# Patient Record
Sex: MALE | Race: WHITE | NOT HISPANIC OR LATINO | Employment: OTHER | URBAN - METROPOLITAN AREA
[De-identification: names, ages, dates, MRNs, and addresses within clinical notes are randomized per-mention and may not be internally consistent; named-entity substitution may affect disease eponyms.]

---

## 2017-01-31 RX ORDER — PRAVASTATIN SODIUM 40 MG
TABLET ORAL
Qty: 90 TABLET | Refills: 2 | Status: SHIPPED | OUTPATIENT
Start: 2017-01-31 | End: 2018-04-30 | Stop reason: SDUPTHER

## 2017-02-01 ENCOUNTER — OFFICE VISIT (OUTPATIENT)
Dept: ORTHOPEDIC SURGERY | Facility: CLINIC | Age: 62
End: 2017-02-01

## 2017-02-01 VITALS — WEIGHT: 210 LBS | BODY MASS INDEX: 27.83 KG/M2 | HEIGHT: 73 IN

## 2017-02-01 DIAGNOSIS — IMO0002 BURSITIS/TENDONITIS, SHOULDER: Primary | ICD-10-CM

## 2017-02-01 PROCEDURE — 99212 OFFICE O/P EST SF 10 MIN: CPT | Performed by: ORTHOPAEDIC SURGERY

## 2017-02-01 RX ORDER — FINASTERIDE 5 MG/1
TABLET, FILM COATED ORAL
COMMUNITY
Start: 2017-01-10 | End: 2018-04-30

## 2017-02-01 NOTE — PROGRESS NOTES
Subjective: Right shoulder pain     Patient ID: Chad Marte is a 61 y.o. male.    Chief Complaint:    History of Present Illness 61-year-old male returns with recurrent right shoulder pain.  He's had this for 9 months or longer.  Is undergone a cortisone injection which helped for approximately 4 weeks.  His been on anti-inflammatories is noted no response.  He is having pain with activities of daily living.       Social History     Occupational History   • Not on file.     Social History Main Topics   • Smoking status: Never Smoker   • Smokeless tobacco: Never Used   • Alcohol use Not on file   • Drug use: No   • Sexual activity: Defer      Review of Systems   Constitutional: Negative for chills, diaphoresis, fever and unexpected weight change.   HENT: Negative for hearing loss, nosebleeds, sore throat and tinnitus.    Eyes: Negative for pain and visual disturbance.   Respiratory: Negative for cough, shortness of breath and wheezing.    Cardiovascular: Negative for chest pain and palpitations.   Gastrointestinal: Negative for abdominal pain, diarrhea, nausea and vomiting.   Endocrine: Negative for cold intolerance, heat intolerance and polydipsia.   Genitourinary: Negative for difficulty urinating, dysuria and hematuria.   Musculoskeletal: Positive for arthralgias and myalgias. Negative for joint swelling.   Skin: Negative for rash and wound.   Allergic/Immunologic: Negative for environmental allergies.   Neurological: Negative for dizziness, syncope and numbness.   Hematological: Does not bruise/bleed easily.   Psychiatric/Behavioral: Negative for dysphoric mood and sleep disturbance. The patient is not nervous/anxious.          Past Medical History   Diagnosis Date   • Arthritis    • Hypercholesteremia    • Rotator cuff syndrome    • Tear of meniscus of knee      Past Surgical History   Procedure Laterality Date   • Tonsillectomy     • Hernia repair     • Fracture surgery Right      R arm- plate   • Tendon transfer  Right      hand   • Colonoscopy N/A 5/27/2016     Procedure: COLONOSCOPY / polypectomy;  Surgeon: Heath Naik MD;  Location: [facility];  Service:      Family History   Problem Relation Age of Onset   • Cancer Father          Objective:  There were no vitals filed for this visit.  Last 3 weights    02/01/17  1253   Weight: 210 lb (95.3 kg)     Body mass index is 27.71 kg/(m^2).       Ortho Exam  there is no motor deficit good distal pulses.  Mildly positive impingement sign abduction and extension against resistance is intact although moderately painful marked pain with internal rotation with a positive lift off.  Mildly positive for cyst.    Assessment:       1. Bursitis/tendonitis, shoulder          Plan:  We'll proceed with an MR arthrogram return after completion of the test          Work Status:    SANDHYA query complete.    Orders:  Orders Placed This Encounter   Procedures   • MRI Shoulder Right Arthrogram       Medications:  New Medications Ordered This Visit   Medications   • finasteride (PROSCAR) 5 MG tablet       Followup:  Return in about 1 week (around 2/8/2017).          Dragon transcription disclaimer     Much of this encounter note is an electronic transcription/translation of spoken language to printed text. The electronic translation of spoken language may permit erroneous, or at times, nonsensical words or phrases to be inadvertently transcribed. Although I have reviewed the note for such errors, some may still exist.

## 2017-02-02 ENCOUNTER — HOSPITAL ENCOUNTER (OUTPATIENT)
Dept: GENERAL RADIOLOGY | Facility: HOSPITAL | Age: 62
Discharge: HOME | End: 2017-02-02
Attending: ORTHOPAEDIC SURGERY

## 2017-02-02 ENCOUNTER — HOSPITAL ENCOUNTER (OUTPATIENT)
Dept: MRI IMAGING | Facility: HOSPITAL | Age: 62
Discharge: HOME | End: 2017-02-02
Attending: ORTHOPAEDIC SURGERY | Admitting: ORTHOPAEDIC SURGERY

## 2017-02-02 DIAGNOSIS — IMO0002 BURSITIS/TENDONITIS, SHOULDER: ICD-10-CM

## 2017-02-02 PROCEDURE — 73040 CONTRAST X-RAY OF SHOULDER: CPT

## 2017-02-02 PROCEDURE — 73222 MRI JOINT UPR EXTREM W/DYE: CPT

## 2017-02-02 PROCEDURE — 0 GADOBENATE DIMEGLUMINE 529 MG/ML SOLUTION: Performed by: ORTHOPAEDIC SURGERY

## 2017-02-02 PROCEDURE — A9577 INJ MULTIHANCE: HCPCS | Performed by: ORTHOPAEDIC SURGERY

## 2017-02-02 PROCEDURE — 0 IOPAMIDOL 61 % SOLUTION: Performed by: ORTHOPAEDIC SURGERY

## 2017-02-02 RX ORDER — LIDOCAINE HYDROCHLORIDE 10 MG/ML
3 INJECTION, SOLUTION INFILTRATION; PERINEURAL ONCE
Status: COMPLETED | OUTPATIENT
Start: 2017-02-02 | End: 2017-02-02

## 2017-02-02 RX ADMIN — IOPAMIDOL 10 ML: 612 INJECTION, SOLUTION INTRAVENOUS at 11:05

## 2017-02-02 RX ADMIN — LIDOCAINE HYDROCHLORIDE 3 ML: 10 INJECTION, SOLUTION INFILTRATION; PERINEURAL at 11:05

## 2017-02-02 RX ADMIN — GADOBENATE DIMEGLUMINE 1 ML: 529 INJECTION, SOLUTION INTRAVENOUS at 11:00

## 2017-02-06 ENCOUNTER — OFFICE VISIT (OUTPATIENT)
Dept: ORTHOPEDIC SURGERY | Facility: CLINIC | Age: 62
End: 2017-02-06

## 2017-02-06 DIAGNOSIS — IMO0002 BURSITIS/TENDONITIS, SHOULDER: Primary | ICD-10-CM

## 2017-02-06 PROCEDURE — 99212 OFFICE O/P EST SF 10 MIN: CPT | Performed by: ORTHOPAEDIC SURGERY

## 2017-02-06 RX ORDER — METHYLPREDNISOLONE 4 MG/1
TABLET ORAL
Qty: 21 TABLET | Refills: 0 | Status: SHIPPED | OUTPATIENT
Start: 2017-02-06 | End: 2017-04-28

## 2017-02-06 NOTE — PROGRESS NOTES
Subjective: Right shoulder pain     Patient ID: Chad Marte is a 61 y.o. male.    Chief Complaint:    History of Present Illness patient returns with results of the MRI arthrogram of his right shoulder which I personally reviewed and shows acromioclavicular arthritis but no other pathology.  No rotator cuff or labral tear.  The cortisone injection given last for only about 4 weeks.       Social History     Occupational History   • Not on file.     Social History Main Topics   • Smoking status: Never Smoker   • Smokeless tobacco: Never Used   • Alcohol use Not on file   • Drug use: No   • Sexual activity: Defer      Review of Systems   Constitutional: Negative for chills, diaphoresis, fever and unexpected weight change.   HENT: Negative for hearing loss, nosebleeds, sore throat and tinnitus.    Eyes: Negative for pain and visual disturbance.   Respiratory: Negative for cough, shortness of breath and wheezing.    Cardiovascular: Negative for chest pain and palpitations.   Gastrointestinal: Negative for abdominal pain, diarrhea, nausea and vomiting.   Endocrine: Negative for cold intolerance, heat intolerance and polydipsia.   Genitourinary: Negative for difficulty urinating, dysuria and hematuria.   Musculoskeletal: Negative for arthralgias, joint swelling and myalgias.   Skin: Negative for rash and wound.   Allergic/Immunologic: Negative for environmental allergies.   Neurological: Negative for dizziness, syncope and numbness.   Hematological: Does not bruise/bleed easily.   Psychiatric/Behavioral: Negative for dysphoric mood and sleep disturbance. The patient is not nervous/anxious.          Past Medical History   Diagnosis Date   • Arthritis    • Hypercholesteremia    • Rotator cuff syndrome    • Tear of meniscus of knee      Past Surgical History   Procedure Laterality Date   • Tonsillectomy     • Hernia repair     • Fracture surgery Right      R arm- plate   • Tendon transfer Right      hand   • Colonoscopy N/A  5/27/2016     Procedure: COLONOSCOPY / polypectomy;  Surgeon: Heath Naik MD;  Location: [facility];  Service:      Family History   Problem Relation Age of Onset   • Cancer Father          Objective:  There were no vitals filed for this visit.  There were no vitals filed for this visit.  There is no height or weight on file to calculate BMI.       Ortho Exam  exam is unchanged.  There is no motor deficit good distal pulses.  There is pain with abduction and extension against resistance but it is intact.    Assessment:       1. Bursitis/tendonitis, shoulder          Plan:      we will try a Medrol dose pack followed by resumption of the Mobic.  Return in 4 weeks if his pain persists his only other option may be an arthroscopic Caesar procedure but I like to try the medication first.      Work Status:    SANDHYA query complete.    Orders:  No orders of the defined types were placed in this encounter.      Medications:  New Medications Ordered This Visit   Medications   • MethylPREDNISolone (MEDROL, OLIVIA,) 4 MG tablet     Sig: Use as directed by package instructions     Dispense:  21 tablet     Refill:  0       Followup:  Return in about 4 weeks (around 3/6/2017).          Dragon transcription disclaimer     Much of this encounter note is an electronic transcription/translation of spoken language to printed text. The electronic translation of spoken language may permit erroneous, or at times, nonsensical words or phrases to be inadvertently transcribed. Although I have reviewed the note for such errors, some may still exist.

## 2017-03-08 ENCOUNTER — OFFICE VISIT (OUTPATIENT)
Dept: ORTHOPEDIC SURGERY | Facility: CLINIC | Age: 62
End: 2017-03-08

## 2017-03-08 DIAGNOSIS — IMO0002 BURSITIS/TENDONITIS, SHOULDER: Primary | ICD-10-CM

## 2017-03-08 PROCEDURE — 99212 OFFICE O/P EST SF 10 MIN: CPT | Performed by: ORTHOPAEDIC SURGERY

## 2017-03-13 ENCOUNTER — HOSPITAL ENCOUNTER (OUTPATIENT)
Dept: PHYSICAL THERAPY | Facility: HOSPITAL | Age: 62
Setting detail: THERAPIES SERIES
Discharge: HOME | End: 2017-03-13

## 2017-03-13 DIAGNOSIS — IMO0002 BURSITIS/TENDONITIS, SHOULDER: Primary | ICD-10-CM

## 2017-03-13 PROCEDURE — 97161 PT EVAL LOW COMPLEX 20 MIN: CPT

## 2017-03-13 NOTE — PROGRESS NOTES
Outpatient Physical Therapy Ortho Initial Evaluation   Lucy Knapp     Patient Name: Chad Marte  : 1955  MRN: [id number]  Today's Date: 3/13/2017      Visit Date: 2017    Patient Active Problem List   Diagnosis   • Generalized osteoarthritis   • Hematuria   • Hyperlipidemia   • Proteinuria   • Preventative health care   • Health care maintenance   • H/O prostate biopsy   • Primary osteoarthritis of left knee        Past Medical History   Diagnosis Date   • Arthritis    • Hypercholesteremia    • Rotator cuff syndrome    • Tear of meniscus of knee         Past Surgical History   Procedure Laterality Date   • Tonsillectomy     • Hernia repair     • Fracture surgery Right      R arm- plate   • Tendon transfer Right      hand   • Colonoscopy N/A 2016     Procedure: COLONOSCOPY / polypectomy;  Surgeon: Heath Naik MD;  Location: [facility] OR;  Service:        Visit Dx:     ICD-10-CM ICD-9-CM   1. Bursitis/tendonitis, shoulder M71.9 726.10    M67.919              Patient History       17 0800          History    Chief Complaint Difficulty with daily activities;Joint stiffness;Muscle tenderness;Muscle weakness;Pain  -AS      Type of Pain Shoulder pain   Right  -AS      Date Current Problem(s) Began 16  -AS      Brief Description of Current Complaint Patient reports he initially injured his right shoulder while passing ball with his grandson. Patient states his pain has worsened over past few months. He received an injection that did help with his pain for about 1 month. Patient sattes the pain and discomfort affects his workouts and ADL's.  -AS      Onset Date- PT 3-13-17  -AS      Patient/Caregiver Goals Relieve pain;Return to prior level of function;Improve mobility;Improve strength  -AS      Patient's Rating of General Health Good  -AS      Hand Dominance right-handed  -AS      Occupation/sports/leisure activities working out  -AS      Pain     Pain Location Shoulder  -AS       Pain at Present 0  -AS      Pain at Best 0  -AS      Pain at Worst 8  -AS      Pain Frequency Intermittent  -AS      Pain Description Aching  -AS      What Performance Factors Make the Current Problem(s) WORSE? overhead motion  -AS      What Performance Factors Make the Current Problem(s) BETTER? rest  -AS      Daily Activities    Primary Language English  -AS      How does patient learn best? Listening;Reading  -AS      Teaching needs identified Home Exercise Program;Management of Condition  -AS      Patient is concerned about/has problems with Performing home management (household chores, shopping, care of dependents);Performing job responsibilities/community activities (work, school,;Performing sports, recreation, and play activities;Reaching over head;Repetitive movements of the hand, arm, shoulder  -AS      Does patient have problems with the following? None  -AS      Barriers to learning None  -AS      Pt Participated in POC and Goals Yes  -AS      Safety    Are you being hurt, hit, or frightened by anyone at home or in your life? No  -AS      Are you being neglected by a caregiver No  -AS        User Key  (r) = Recorded By, (t) = Taken By, (c) = Cosigned By    Initials Name Provider Type    AS Johnathan Grace, PT Physical Therapist                PT Ortho       03/13/17 0800    Precautions and Contraindications    Precautions/Limitations no known precautions/limitations  -AS    Posture/Observations    Posture- WNL Posture is WNL  -AS    Sensation    Sensation WNL? WNL  -AS    Shoulder Girdle Palpation    AC Joint Right:;Tender  -AS    Long Head of Biceps Right:;Tender  -AS    Pect Minor Right:;Tender  -AS    Shoulder Laxity/Instability Special Tests    Horizontal Adduction Test (AC Joint Pain) Right:;Positive  -AS    Right Shoulder    Flexion AROM Deficit 152  -AS    ABduction AROM Deficit 156  -AS    External Rotation AROM Deficit 32  -AS    Internal Rotation AROM Deficit WNL  -AS    Right Shoulder     Flexion Gross Movement (4-/5) good minus  -AS    ABduction Gross Movement (4-/5) good minus  -AS    Int Rotation Gross Movement (4-/5) good minus  -AS    Ext Rotation Gross Movement (4-/5) good minus  -AS      User Key  (r) = Recorded By, (t) = Taken By, (c) = Cosigned By    Initials Name Provider Type    AS Johnathan Grace, PT Physical Therapist                            Therapy Education       03/13/17 0833          Therapy Education    Given HEP  -AS      Program New  -AS      How Provided Verbal;Written;Demonstration  -AS      Provided to Patient  -AS      Level of Understanding Teach back education performed;Verbalized;Demonstrated  -AS        User Key  (r) = Recorded By, (t) = Taken By, (c) = Cosigned By    Initials Name Provider Type    AS Johnathan Grace, PT Physical Therapist                PT OP Goals       03/13/17 0800       PT Short Term Goals    STG Date to Achieve 03/27/17  -AS     STG 1 Patient to be compliant with his initial HEP for ROM, flexibility, and strength.  -AS     STG 2 Patient to report pain on VAS of 4-5/10 when performing ADL's, recreational, and sport related activities.  -AS     STG 3 Patient to increase his general right shoulder strength to 4/5.  -AS     Long Term Goals    LTG Date to Achieve 04/10/17  -AS     LTG 1 Patient to be compliant with his advanced HEP for ROM, flexibility, and strength.  -AS     LTG 2 Patient to report pain on VAS of 1-2/10 when performing ADL's, recreational, and sport related activities.  -AS     LTG 3 Patient to increase his general right shoulder strength to 4+/5.  -AS     LTG 4 Patient to improve his right shoulder AROM to WNL in all planes.  -AS     LTG 5 Patient to report improved function and decreased pain in his right shoulder/AC joint on Quick DASH by >20 points.  -AS     Time Calculation    PT Goal Re-Cert Due Date 04/10/17  -AS       User Key  (r) = Recorded By, (t) = Taken By, (c) = Cosigned By    Initials Name Provider Type     AS Johnathan Grace, PT Physical Therapist                PT Assessment/Plan       03/13/17 0800       PT Assessment    Functional Limitations Limitation in home management;Limitations in community activities;Performance in leisure activities;Performance in self-care ADL;Performance in sport activities;Performance in work activities  -AS     Impairments Impaired flexibility;Joint mobility;Muscle strength;Pain;Range of motion  -AS     Assessment Comments Patient presents to outpatient PT with complaints of right AC joint pain and discomfort. Patient has limited ROM in right shoulder, limited right shoulder strength, and increased right shoulder/AC joint pain. Patient has decreased function due to the above.  -AS     Please refer to paper survey for additional self-reported information Yes  -AS     Rehab Potential Good  -AS     Patient/caregiver participated in establishment of treatment plan and goals Yes  -AS     Patient would benefit from skilled therapy intervention Yes  -AS     PT Plan    PT Frequency 2x/week  -AS     Predicted Duration of Therapy Intervention (days/wks) 4 weeks  -AS     Planned CPT's? PT RE-EVAL: 93932;PT THER PROC EA 15 MIN: 90907;PT THER ACT EA 15 MIN: 77058;PT MANUAL THERAPY EA 15 MIN: 15683;PT HOT OR COLD PACK TREAT [facility];PT ELECTRICAL STIM UNATTEND: ;PT ULTRASOUND EA 15 MIN: 79825  -AS       User Key  (r) = Recorded By, (t) = Taken By, (c) = Cosigned By    Initials Name Provider Type    AS Johnathan Grace, PT Physical Therapist                Modalities       03/13/17 0800          Moist Heat    MH Applied Yes  -AS      Location right shoulder  -AS      Rx Minutes 10 mins  -AS      MH Prior to Rx Yes  -AS      Ice    Ice Applied Yes  -AS      Location right shoulder  -AS      Rx Minutes 10 mins  -AS      Ice S/P Rx Yes  -AS      Ultrasound 29492    Location right AC joint  -AS      Rx Minutes 8 min  -AS      Duty Cycle 100  -AS      Frequency 1.0 MHz  -AS      Intensity -  Wts/cm 1.2  -AS        User Key  (r) = Recorded By, (t) = Taken By, (c) = Cosigned By    Initials Name Provider Type    AS Johnathan Grace, PT Physical Therapist              Exercises       03/13/17 0800          Subjective Pain    Able to rate subjective pain? yes  -AS      Pre-Treatment Pain Level 0  -AS      Post-Treatment Pain Level 0  -AS      Exercise 1    Exercise Name 1 IR Towel Stretch  -AS      Reps 1 10  -AS      Time (Seconds) 1 10 sec hold.  -AS      Exercise 2    Exercise Name 2 Sleeper Stretch  -AS      Reps 2 10  -AS      Time (Seconds) 2 10 sec hold  -AS      Exercise 3    Exercise Name 3 Sidelying ER  -AS      Equipment 3 Dumbell  -AS      Weights/Plates 3 1  -AS      Reps 3 25  -AS      Exercise 4    Exercise Name 4 Prone I, T  -AS      Equipment 4 Dumbell  -AS      Weights/Plates 4 1  -AS      Reps 4 25  -AS      Exercise 5    Exercise Name 5 Rows  -AS      Equipment 5 Theraband  -AS      Resistance 5 Blue  -AS      Reps 5 25  -AS      Exercise 6    Exercise Name 6 Shoulder Ext. vs Band  -AS      Equipment 6 Theraband  -AS      Resistance 6 Blue  -AS      Reps 6 25  -AS        User Key  (r) = Recorded By, (t) = Taken By, (c) = Cosigned By    Initials Name Provider Type    AS Johnathan Grace, PT Physical Therapist                              Outcome Measures       03/13/17 0800          Quick DASH    Open a tight or new jar. 3  -AS      Do heavy household chores (e.g., wash walls, wash floors) 4  -AS      Carry a shopping bag or briefcase 3  -AS      Wash your back 4  -AS      Use a knife to cut food 1  -AS      Recreational activities in which you take some force or impact through your arm, should or hand (e.g. golf, hammering, tennis, etc.) 3  -AS      During the past week, to what extent has your arm, shoulder, or hand problem interfered with your normal social activites with family, friends, neighbors or groups? 3  -AS      During the past week, were you limited in your work or  other regular daily activities as a result of your arm, shoulder or hand problem? 4  -AS      Arm, Shoulder, or hand pain 4  -AS      Tingling (pins and needles) in your arm, shoulder, or hand 1  -AS      During the past week, how much difficulty have you had sleeping because of the pain in your arm, shoulder or hand? 3  -AS      Number of Questions Answered 11  -AS      Quick DASH Score 50  -AS      Sports/Performing Arts Module (Optional)    Using your usual technique for playing your instrument or sport? 3  -AS      Playing your musical instrument or sport because of arm, shoulder or hand pain? 3  -AS      Playing your musical instrument or sport as well as you would like? 3  -AS      Spending your usual amount of time practising or playing your instrument or sport? 3  -AS      Sports/Performing Arts Score 50  -AS      Functional Assessment    Outcome Measure Options Quick DASH  -AS        User Key  (r) = Recorded By, (t) = Taken By, (c) = Cosigned By    Initials Name Provider Type    AS Johnathan Grace, PT Physical Therapist            Time Calculation:   Start Time: 0800  Stop Time: 0915  Time Calculation (min): 75 min     Therapy Charges for Today     Code Description Service Date Service Provider Modifiers Qty    53270680935  PT EVAL LOW COMPLEXITY 4 3/13/2017 Johnathan Grace, PT GP 1          PT G-Codes  Outcome Measure Options: Quick DASH         Johnathan Grace, PT  3/13/2017

## 2017-03-16 ENCOUNTER — HOSPITAL ENCOUNTER (OUTPATIENT)
Dept: PHYSICAL THERAPY | Facility: HOSPITAL | Age: 62
Setting detail: THERAPIES SERIES
Discharge: HOME | End: 2017-03-16

## 2017-03-16 DIAGNOSIS — IMO0002 BURSITIS/TENDONITIS, SHOULDER: Primary | ICD-10-CM

## 2017-03-16 PROCEDURE — 97110 THERAPEUTIC EXERCISES: CPT

## 2017-03-16 PROCEDURE — 97035 APP MDLTY 1+ULTRASOUND EA 15: CPT

## 2017-03-16 NOTE — PROGRESS NOTES
Outpatient Physical Therapy Ortho Treatment Note   Lucy Knapp     Patient Name: Chad Marte  : 1955  MRN: [id number]  Today's Date: 3/16/2017      Visit Date: 2017    Visit Dx:    ICD-10-CM ICD-9-CM   1. Bursitis/tendonitis, shoulder M71.9 726.10    M67.919        Patient Active Problem List   Diagnosis   • Generalized osteoarthritis   • Hematuria   • Hyperlipidemia   • Proteinuria   • Preventative health care   • Health care maintenance   • H/O prostate biopsy   • Primary osteoarthritis of left knee        Past Medical History   Diagnosis Date   • Arthritis    • Hypercholesteremia    • Rotator cuff syndrome    • Tear of meniscus of knee         Past Surgical History   Procedure Laterality Date   • Tonsillectomy     • Hernia repair     • Fracture surgery Right      R arm- plate   • Tendon transfer Right      hand   • Colonoscopy N/A 2016     Procedure: COLONOSCOPY / polypectomy;  Surgeon: Heath Naik MD;  Location: [facility];  Service:                              PT Assessment/Plan       17 0800       PT Assessment    Assessment Comments Progressed patient with strengthening exercises today without complaints of increased right AC joint pain or discomfort.  -AS     PT Plan    PT Plan Comments Continue with current treatment plan.  -AS       User Key  (r) = Recorded By, (t) = Taken By, (c) = Cosigned By    Initials Name Provider Type    AS Johnathan Grace, PT Physical Therapist                Modalities       17 0800          Moist Heat    MH Applied Yes  -AS      Location right shoulder  -AS      Rx Minutes 10 mins  -AS      MH Prior to Rx Yes  -AS      Ice    Ice Applied Yes  -AS      Location right shoulder  -AS      Rx Minutes 10 mins  -AS      Ice S/P Rx Yes  -AS      Ultrasound 68636    Location right AC joint  -AS      Rx Minutes 8 min  -AS      Duty Cycle 100  -AS      Frequency 1.0 MHz  -AS      Intensity - Wts/cm 1.2  -AS        User Key  (r) = Recorded  By, (t) = Taken By, (c) = Cosigned By    Initials Name Provider Type    AS Johnathan Grace, PT Physical Therapist                Exercises       03/16/17 0800          Subjective Comments    Subjective Comments Patient states that his pain is better but reports he is sore from his exercises.  -AS      Exercise 1    Exercise Name 1 IR Towel Stretch  -AS      Reps 1 10  -AS      Time (Seconds) 1 10 sec hold.  -AS      Exercise 2    Exercise Name 2 Sleeper Stretch  -AS      Reps 2 10  -AS      Time (Seconds) 2 10 sec hold  -AS      Exercise 3    Exercise Name 3 Sidelying ER  -AS      Equipment 3 Dumbell  -AS      Weights/Plates 3 1  -AS      Reps 3 30  -AS      Exercise 4    Exercise Name 4 Prone I, T  -AS      Equipment 4 Dumbell  -AS      Weights/Plates 4 1  -AS      Reps 4 30  -AS      Exercise 5    Exercise Name 5 Rows  -AS      Equipment 5 Theraband  -AS      Resistance 5 Blue  -AS      Reps 5 30  -AS      Exercise 6    Exercise Name 6 Shoulder Ext. vs Band  -AS      Equipment 6 Theraband  -AS      Resistance 6 Blue  -AS      Reps 6 30  -AS      Exercise 7    Exercise Name 7 IR  -AS      Equipment 7 Theraband  -AS      Resistance 7 Blue  -AS      Reps 7 30  -AS      Exercise 8    Exercise Name 8 ER  -AS      Equipment 8 Theraband  -AS      Resistance 8 Blue  -AS      Reps 8 30  -AS      Exercise 9    Exercise Name 9 Serratus Punches  -AS      Equipment 9 Dumbell  -AS      Weights/Plates 9 1  -AS      Reps 9 30  -AS      Exercise 10    Exercise Name 10 Empty/Full Can  -AS      Equipment 10 Dumbell  -AS      Weights/Plates 10 1  -AS      Reps 10 30  -AS        User Key  (r) = Recorded By, (t) = Taken By, (c) = Cosigned By    Initials Name Provider Type    AS Johnathan Grace, PT Physical Therapist                                       Time Calculation:   Start Time: 0758  Stop Time: 0859  Time Calculation (min): 61 min    Therapy Charges for Today     Code Description Service Date Service Provider Modifiers  Qty    [id number]  PT THER PROC EA 15 MIN 3/16/2017 Johnathan Grace, PT GP 2    [id number] HC PT ULTRASOUND EA 15 MIN 3/16/2017 Johnathan Grace, PT GP 1                    Johnathan Grace, PT  3/16/2017

## 2017-03-20 ENCOUNTER — HOSPITAL ENCOUNTER (OUTPATIENT)
Dept: PHYSICAL THERAPY | Facility: HOSPITAL | Age: 62
Setting detail: THERAPIES SERIES
Discharge: HOME | End: 2017-03-20

## 2017-03-20 DIAGNOSIS — IMO0002 BURSITIS/TENDONITIS, SHOULDER: Primary | ICD-10-CM

## 2017-03-20 PROCEDURE — 97110 THERAPEUTIC EXERCISES: CPT

## 2017-03-20 PROCEDURE — 97035 APP MDLTY 1+ULTRASOUND EA 15: CPT

## 2017-03-20 NOTE — PROGRESS NOTES
Outpatient Physical Therapy Ortho Treatment Note   Lucy Knapp     Patient Name: Chad Marte  : 1955  MRN: [id number]  Today's Date: 3/20/2017      Visit Date: 2017    Visit Dx:    ICD-10-CM ICD-9-CM   1. Bursitis/tendonitis, shoulder M71.9 726.10    M67.919        Patient Active Problem List   Diagnosis   • Generalized osteoarthritis   • Hematuria   • Hyperlipidemia   • Proteinuria   • Preventative health care   • Health care maintenance   • H/O prostate biopsy   • Primary osteoarthritis of left knee        Past Medical History   Diagnosis Date   • Arthritis    • Hypercholesteremia    • Rotator cuff syndrome    • Tear of meniscus of knee         Past Surgical History   Procedure Laterality Date   • Tonsillectomy     • Hernia repair     • Fracture surgery Right      R arm- plate   • Tendon transfer Right      hand   • Colonoscopy N/A 2016     Procedure: COLONOSCOPY / polypectomy;  Surgeon: Heath Naik MD;  Location: [facility];  Service:                              PT Assessment/Plan       17 0800       PT Assessment    Assessment Comments Progressed patient with strengthening exercises today without complaints of increased right AC joint pain or discomfort.  -AS     PT Plan    PT Plan Comments Continue with current treatment plan.  -AS       User Key  (r) = Recorded By, (t) = Taken By, (c) = Cosigned By    Initials Name Provider Type    AS Johnathan Grace, PT Physical Therapist                Modalities       17 0800          Moist Heat    MH Applied Yes  -AS      Location right shoulder  -AS      Rx Minutes 10 mins  -AS      MH Prior to Rx Yes  -AS      Ice    Ice Applied Yes  -AS      Location right shoulder  -AS      Rx Minutes 10 mins  -AS      Ice S/P Rx Yes  -AS      Ultrasound 38660    Location right AC joint  -AS      Rx Minutes 8 min  -AS      Duty Cycle 100  -AS      Frequency 1.0 MHz  -AS      Intensity - Wts/cm 1.2  -AS        User Key  (r) = Recorded  By, (t) = Taken By, (c) = Cosigned By    Initials Name Provider Type    AS Johnathan Grace, PT Physical Therapist                Exercises       03/20/17 0800          Subjective Comments    Subjective Comments Patient states that he feels his right shoulder feels the same as it did his last treatment session.  -AS      Exercise 1    Exercise Name 1 IR Towel Stretch  -AS      Reps 1 10  -AS      Time (Seconds) 1 10 sec hold.  -AS      Exercise 2    Exercise Name 2 Sleeper Stretch  -AS      Reps 2 10  -AS      Time (Seconds) 2 10 sec hold  -AS      Exercise 3    Exercise Name 3 Sidelying ER  -AS      Equipment 3 Dumbell  -AS      Weights/Plates 3 1  -AS      Reps 3 Other   40  -AS      Exercise 4    Exercise Name 4 Prone I, T  -AS      Equipment 4 Dumbell  -AS      Weights/Plates 4 1  -AS      Reps 4 Other   40  -AS      Exercise 5    Exercise Name 5 Rows  -AS      Equipment 5 Theraband  -AS      Resistance 5 Blue  -AS      Reps 5 Other   40  -AS      Exercise 6    Exercise Name 6 Shoulder Ext. vs Band  -AS      Equipment 6 Theraband  -AS      Resistance 6 Blue  -AS      Reps 6 Other   40  -AS      Exercise 7    Exercise Name 7 IR  -AS      Equipment 7 Theraband  -AS      Resistance 7 Blue  -AS      Reps 7 Other   40  -AS      Exercise 8    Exercise Name 8 ER  -AS      Equipment 8 Theraband  -AS      Resistance 8 Blue  -AS      Reps 8 Other   40  -AS      Exercise 9    Exercise Name 9 Serratus Punches  -AS      Equipment 9 Dumbell  -AS      Weights/Plates 9 1  -AS      Reps 9 Other   40  -AS      Exercise 10    Exercise Name 10 Empty/Full Can  -AS      Equipment 10 Dumbell  -AS      Weights/Plates 10 1  -AS      Reps 10 --  -AS        User Key  (r) = Recorded By, (t) = Taken By, (c) = Cosigned By    Initials Name Provider Type    AS Johnathan Grace, PT Physical Therapist                                       Time Calculation:   Start Time: 0755  Stop Time: 0848  Time Calculation (min): 53 min    Therapy  Charges for Today     Code Description Service Date Service Provider Modifiers Qty    43190406310  PT THER PROC EA 15 MIN 3/20/2017 Johnathan Grace, PT GP 2    63254150630 HC PT ULTRASOUND EA 15 MIN 3/20/2017 Johnathan Grace, PT GP 1                    Johnathan Grace, PT  3/20/2017

## 2017-03-23 ENCOUNTER — HOSPITAL ENCOUNTER (OUTPATIENT)
Dept: PHYSICAL THERAPY | Facility: HOSPITAL | Age: 62
Setting detail: THERAPIES SERIES
Discharge: HOME | End: 2017-03-23

## 2017-03-23 DIAGNOSIS — IMO0002 BURSITIS/TENDONITIS, SHOULDER: Primary | ICD-10-CM

## 2017-03-23 PROCEDURE — 97010 HOT OR COLD PACKS THERAPY: CPT

## 2017-03-23 PROCEDURE — 97035 APP MDLTY 1+ULTRASOUND EA 15: CPT

## 2017-03-23 PROCEDURE — 97110 THERAPEUTIC EXERCISES: CPT

## 2017-03-23 NOTE — PROGRESS NOTES
Outpatient Physical Therapy Ortho Treatment Note   Lucy Knapp     Patient Name: Chad Marte  : 1955  MRN: [id number]  Today's Date: 3/23/2017      Visit Date: 2017    Visit Dx:    ICD-10-CM ICD-9-CM   1. Bursitis/tendonitis, shoulder M71.9 726.10    M67.919        Patient Active Problem List   Diagnosis   • Generalized osteoarthritis   • Hematuria   • Hyperlipidemia   • Proteinuria   • Preventative health care   • Health care maintenance   • H/O prostate biopsy   • Primary osteoarthritis of left knee        Past Medical History:   Diagnosis Date   • Arthritis    • Hypercholesteremia    • Rotator cuff syndrome    • Tear of meniscus of knee         Past Surgical History:   Procedure Laterality Date   • COLONOSCOPY N/A 2016    Procedure: COLONOSCOPY / polypectomy;  Surgeon: Heath Naik MD;  Location: [facility];  Service:    • FRACTURE SURGERY Right     R arm- plate   • HERNIA REPAIR     • TENDON TRANSFER Right     hand   • TONSILLECTOMY                               PT Assessment/Plan       17 1000       PT Assessment    Assessment Comments Patient continues to do well with PT with decreased complaints of pain and increased function in right shoulder.  -AS     PT Plan    PT Plan Comments Continue with current treatment plan.  -AS       User Key  (r) = Recorded By, (t) = Taken By, (c) = Cosigned By    Initials Name Provider Type    AS Johnathan Grace, PT Physical Therapist                Modalities       17 1000          Moist Heat    MH Applied Yes  -AS      Location right shoulder  -AS      Rx Minutes 10 mins  -AS      MH Prior to Rx Yes  -AS      Ultrasound 86804    Location right AC joint  -AS      Rx Minutes 8 min  -AS      Duty Cycle 100  -AS      Frequency 1.0 MHz  -AS      Intensity - Wts/cm 1.2  -AS        User Key  (r) = Recorded By, (t) = Taken By, (c) = Cosigned By    Initials Name Provider Type    AS Johnathan Grace, PT Physical Therapist                 Exercises       03/23/17 1000          Subjective Comments    Subjective Comments Patient states he was able to put up dry wall overhead this week and states he had some discomfort and pain but it was not severe.  -AS      Exercise 1    Exercise Name 1 IR Towel Stretch  -AS      Reps 1 10  -AS      Time (Seconds) 1 10 sec hold.  -AS      Exercise 2    Exercise Name 2 Sleeper Stretch  -AS      Reps 2 10  -AS      Time (Seconds) 2 10 sec hold  -AS      Exercise 3    Exercise Name 3 Sidelying ER  -AS      Equipment 3 Dumbell  -AS      Weights/Plates 3 2  -AS      Reps 3 30  -AS      Exercise 4    Exercise Name 4 Prone I, T  -AS      Equipment 4 Dumbell  -AS      Weights/Plates 4 2  -AS      Reps 4 20  -AS      Exercise 5    Exercise Name 5 Rows  -AS      Equipment 5 Theraband  -AS      Resistance 5 Black  -AS      Reps 5 25  -AS      Exercise 6    Exercise Name 6 Shoulder Ext. vs Band  -AS      Equipment 6 Theraband  -AS      Resistance 6 Black  -AS      Reps 6 25  -AS      Exercise 7    Exercise Name 7 IR  -AS      Equipment 7 Theraband  -AS      Resistance 7 Black  -AS      Reps 7 25  -AS      Exercise 8    Exercise Name 8 ER  -AS      Equipment 8 Theraband  -AS      Resistance 8 Black  -AS      Reps 8 25  -AS      Exercise 9    Exercise Name 9 Serratus Punches  -AS      Equipment 9 Dumbell  -AS      Weights/Plates 9 2  -AS      Reps 9 30  -AS      Exercise 10    Exercise Name 10 Empty/Full Can  -AS      Equipment 10 Dumbell  -AS      Weights/Plates 10 2  -AS      Reps 10 25  -AS        User Key  (r) = Recorded By, (t) = Taken By, (c) = Cosigned By    Initials Name Provider Type    AS Johnathan Grace PT Physical Therapist                                       Time Calculation:   Start Time: 0756  Stop Time: 0852  Time Calculation (min): 56 min    Therapy Charges for Today     Code Description Service Date Service Provider Modifiers Qty    87731440739  PT THER PROC EA 15 MIN 3/23/2017 Johnathan Lucero  Sanjay, PT GP 2    72125018964 HC PT HOT/COLD PACK WC NONMCARE 3/23/2017 Johnathan Grace, PT GP 1    63887942863 HC PT ULTRASOUND EA 15 MIN 3/23/2017 Johnathan Grace, PT GP 1                    Johnathan Grace, PT  3/23/2017

## 2017-03-27 ENCOUNTER — HOSPITAL ENCOUNTER (OUTPATIENT)
Dept: PHYSICAL THERAPY | Facility: HOSPITAL | Age: 62
Setting detail: THERAPIES SERIES
Discharge: HOME | End: 2017-03-27

## 2017-03-27 DIAGNOSIS — IMO0002 BURSITIS/TENDONITIS, SHOULDER: Primary | ICD-10-CM

## 2017-03-27 PROCEDURE — 97110 THERAPEUTIC EXERCISES: CPT

## 2017-03-27 PROCEDURE — 97035 APP MDLTY 1+ULTRASOUND EA 15: CPT

## 2017-03-27 PROCEDURE — 97010 HOT OR COLD PACKS THERAPY: CPT

## 2017-03-27 NOTE — PROGRESS NOTES
Outpatient Physical Therapy Ortho Treatment Note   Lucy Knapp     Patient Name: Chad Marte  : 1955  MRN: [id number]  Today's Date: 3/27/2017      Visit Date: 2017    Visit Dx:    ICD-10-CM ICD-9-CM   1. Bursitis/tendonitis, shoulder M71.9 726.10    M67.919        Patient Active Problem List   Diagnosis   • Generalized osteoarthritis   • Hematuria   • Hyperlipidemia   • Proteinuria   • Preventative health care   • Health care maintenance   • H/O prostate biopsy   • Primary osteoarthritis of left knee        Past Medical History:   Diagnosis Date   • Arthritis    • Hypercholesteremia    • Rotator cuff syndrome    • Tear of meniscus of knee         Past Surgical History:   Procedure Laterality Date   • COLONOSCOPY N/A 2016    Procedure: COLONOSCOPY / polypectomy;  Surgeon: Heath Naik MD;  Location: [facility];  Service:    • FRACTURE SURGERY Right     R arm- plate   • HERNIA REPAIR     • TENDON TRANSFER Right     hand   • TONSILLECTOMY                               PT Assessment/Plan       17 0800       PT Assessment    Assessment Comments Progressed patient with strengthening exercises today without complaints of increased right AC joint pain or discomfort.  -AS     PT Plan    PT Plan Comments Continue with current treatment plan.  -AS       User Key  (r) = Recorded By, (t) = Taken By, (c) = Cosigned By    Initials Name Provider Type    AS Johnathan Grace, PT Physical Therapist                Modalities       17 0800          Moist Heat    MH Applied Yes  -AS      Location right shoulder  -AS      Rx Minutes 10 mins  -AS      MH Prior to Rx Yes  -AS      Ultrasound 06065    Location right AC joint  -AS      Rx Minutes 8 min  -AS      Duty Cycle 100  -AS      Frequency 1.0 MHz  -AS      Intensity - Wts/cm 1.2  -AS        User Key  (r) = Recorded By, (t) = Taken By, (c) = Cosigned By    Initials Name Provider Type    AS Johnathan Grace, PT Physical Therapist                 Exercises       03/27/17 0800          Subjective Comments    Subjective Comments Patient states his shoulder is feeling better but states he continues to have pain with reaching out in front of him.  -AS      Exercise 1    Exercise Name 1 IR Towel Stretch  -AS      Reps 1 10  -AS      Time (Seconds) 1 10 sec hold.  -AS      Exercise 2    Exercise Name 2 Sleeper Stretch  -AS      Reps 2 10  -AS      Time (Seconds) 2 10 sec hold  -AS      Exercise 3    Exercise Name 3 Sidelying ER  -AS      Equipment 3 Dumbell  -AS      Weights/Plates 3 2  -AS      Reps 3 Other   40  -AS      Exercise 4    Exercise Name 4 Prone I, T  -AS      Equipment 4 Dumbell  -AS      Weights/Plates 4 2  -AS      Reps 4 Other   40  -AS      Exercise 5    Exercise Name 5 Rows  -AS      Equipment 5 Theraband  -AS      Resistance 5 Black  -AS      Reps 5 30  -AS      Exercise 6    Exercise Name 6 Shoulder Ext. vs Band  -AS      Equipment 6 Theraband  -AS      Resistance 6 Black  -AS      Reps 6 30  -AS      Exercise 7    Exercise Name 7 IR  -AS      Equipment 7 Theraband  -AS      Resistance 7 Black  -AS      Reps 7 30  -AS      Exercise 8    Exercise Name 8 ER  -AS      Equipment 8 Theraband  -AS      Resistance 8 Black  -AS      Reps 8 30  -AS      Exercise 9    Exercise Name 9 Serratus Punches  -AS      Equipment 9 Dumbell  -AS      Weights/Plates 9 2  -AS      Reps 9 Other   40  -AS      Exercise 10    Exercise Name 10 Empty/Full Can  -AS      Equipment 10 Dumbell  -AS      Weights/Plates 10 2  -AS      Reps 10 30  -AS        User Key  (r) = Recorded By, (t) = Taken By, (c) = Cosigned By    Initials Name Provider Type    AS Johnathan Grace PT Physical Therapist                                       Time Calculation:   Start Time: 0755  Stop Time: 0856  Time Calculation (min): 61 min    Therapy Charges for Today     Code Description Service Date Service Provider Modifiers Qty    11333089312  PT THER PROC EA 15 MIN 3/27/2017  Johnathan Grace, PT GP 2    82481367814 HC PT HOT/COLD PACK WC NONMCARE 3/27/2017 Johnathan Grace, PT GP 1    13009915561 HC PT ULTRASOUND EA 15 MIN 3/27/2017 Johnathan Grace, PT GP 1                    Johnathan Grace, PT  3/27/2017

## 2017-03-30 ENCOUNTER — HOSPITAL ENCOUNTER (OUTPATIENT)
Dept: PHYSICAL THERAPY | Facility: HOSPITAL | Age: 62
Setting detail: THERAPIES SERIES
Discharge: HOME | End: 2017-03-30

## 2017-03-30 DIAGNOSIS — IMO0002 BURSITIS/TENDONITIS, SHOULDER: Primary | ICD-10-CM

## 2017-03-30 PROCEDURE — 97010 HOT OR COLD PACKS THERAPY: CPT

## 2017-03-30 PROCEDURE — 97035 APP MDLTY 1+ULTRASOUND EA 15: CPT

## 2017-03-30 PROCEDURE — 97110 THERAPEUTIC EXERCISES: CPT

## 2017-03-30 NOTE — PROGRESS NOTES
Outpatient Physical Therapy Ortho Treatment Note   Lucy Knapp     Patient Name: Chad Marte  : 1955  MRN: [id number]  Today's Date: 3/30/2017      Visit Date: 2017    Visit Dx:    ICD-10-CM ICD-9-CM   1. Bursitis/tendonitis, shoulder M71.9 726.10    M67.919        Patient Active Problem List   Diagnosis   • Generalized osteoarthritis   • Hematuria   • Hyperlipidemia   • Proteinuria   • Preventative health care   • Health care maintenance   • H/O prostate biopsy   • Primary osteoarthritis of left knee        Past Medical History:   Diagnosis Date   • Arthritis    • Hypercholesteremia    • Rotator cuff syndrome    • Tear of meniscus of knee         Past Surgical History:   Procedure Laterality Date   • COLONOSCOPY N/A 2016    Procedure: COLONOSCOPY / polypectomy;  Surgeon: Heath Naik MD;  Location: [facility];  Service:    • FRACTURE SURGERY Right     R arm- plate   • HERNIA REPAIR     • TENDON TRANSFER Right     hand   • TONSILLECTOMY                               PT Assessment/Plan       17 1100       PT Assessment    Assessment Comments Patient has progressed well with PT with improved right shoulder ROM, strength, and decreased pain with increased function.  -AS     PT Plan    PT Plan Comments Patient to see MD next week. Will continue with PT as advised.  -AS       User Key  (r) = Recorded By, (t) = Taken By, (c) = Cosigned By    Initials Name Provider Type    AS Johnathan Grace, PT Physical Therapist                Modalities       17 1100          Moist Heat    MH Applied Yes  -AS      Location right shoulder  -AS      Rx Minutes 10 mins  -AS      MH Prior to Rx Yes  -AS      Ultrasound 31366    Location right AC joint  -AS      Rx Minutes 8 min  -AS      Duty Cycle 100  -AS      Frequency 1.0 MHz  -AS      Intensity - Wts/cm 1.2  -AS        User Key  (r) = Recorded By, (t) = Taken By, (c) = Cosigned By    Initials Name Provider Type    AS Johnathan Lucero  Sanjay, PT Physical Therapist                Exercises       03/30/17 1100          Subjective Comments    Subjective Comments Patient states that his shoulder has improved and he is able to weight lift some but not the weight he was doing before.  -AS      Exercise 1    Exercise Name 1 IR Towel Stretch  -AS      Reps 1 10  -AS      Time (Seconds) 1 10 sec hold.  -AS      Exercise 2    Exercise Name 2 Sleeper Stretch  -AS      Reps 2 10  -AS      Time (Seconds) 2 10 sec hold  -AS      Exercise 3    Exercise Name 3 Sidelying ER  -AS      Equipment 3 Dumbell  -AS      Weights/Plates 3 3  -AS      Reps 3 30  -AS      Exercise 4    Exercise Name 4 Prone I, T  -AS      Equipment 4 Dumbell  -AS      Weights/Plates 4 3  -AS      Reps 4 30  -AS      Exercise 5    Exercise Name 5 Rows  -AS      Equipment 5 Theraband  -AS      Resistance 5 Other (comment)   Silver  -AS      Reps 5 30  -AS      Exercise 6    Exercise Name 6 Shoulder Ext. vs Band  -AS      Equipment 6 Theraband  -AS      Resistance 6 Other (comment)   Silver  -AS      Reps 6 30  -AS      Exercise 7    Exercise Name 7 IR  -AS      Equipment 7 Theraband  -AS      Resistance 7 Other (comment)   Silver  -AS      Reps 7 30  -AS      Exercise 8    Exercise Name 8 ER  -AS      Equipment 8 Theraband  -AS      Resistance 8 Other (comment)   Silver  -AS      Reps 8 30  -AS      Exercise 9    Exercise Name 9 Serratus Punches  -AS      Equipment 9 Dumbell  -AS      Weights/Plates 9 3  -AS      Reps 9 30  -AS      Exercise 10    Exercise Name 10 Empty/Full Can  -AS      Equipment 10 Dumbell  -AS      Weights/Plates 10 3  -AS      Reps 10 30  -AS      Exercise 11    Exercise Name 11 Shoulder Flex & ABD vs Band  -AS      Equipment 11 Theraband  -AS      Resistance 11 Green  -AS      Reps 11 30  -AS        User Key  (r) = Recorded By, (t) = Taken By, (c) = Cosigned By    Initials Name Provider Type    AS Johnathan Grace, PT Physical Therapist                                        Time Calculation:   Start Time: 0800  Stop Time: 0902  Time Calculation (min): 62 min    Therapy Charges for Today     Code Description Service Date Service Provider Modifiers Qty    85796222850 HC PT THER PROC EA 15 MIN 3/30/2017 Johnathan Grace, PT GP 2    40297856898 HC PT ULTRASOUND EA 15 MIN 3/30/2017 Johnathan Grace, PT GP 1    20999528209  PT HOT/COLD PACK WC NONMCARE 3/30/2017 Johnathan Grace, PT GP 1                    Johnathan Grace, PT  3/30/2017

## 2017-04-05 ENCOUNTER — OFFICE VISIT (OUTPATIENT)
Dept: ORTHOPEDIC SURGERY | Facility: CLINIC | Age: 62
End: 2017-04-05

## 2017-04-05 DIAGNOSIS — IMO0002 BURSITIS/TENDONITIS, SHOULDER: ICD-10-CM

## 2017-04-05 DIAGNOSIS — M17.12 PRIMARY OSTEOARTHRITIS OF LEFT KNEE: Primary | ICD-10-CM

## 2017-04-05 PROCEDURE — 99212 OFFICE O/P EST SF 10 MIN: CPT | Performed by: ORTHOPAEDIC SURGERY

## 2017-04-05 RX ORDER — MELOXICAM 7.5 MG/1
7.5 TABLET ORAL DAILY
Qty: 90 TABLET | Refills: 3 | Status: SHIPPED | OUTPATIENT
Start: 2017-04-05 | End: 2017-04-28

## 2017-04-05 NOTE — PROGRESS NOTES
Subjective: Right shoulder pain     Patient ID: Chad Marte is a 62 y.o. male.    Chief Complaint:    History of Present Illness patient returns in follow-up to the cortisone injection and physical therapy with regard to the right shoulder.  Again an MRI is noted to have acromioclavicular arthritis with spurring.  He is noticed significant reduction in his pain discomfort but still he states is unable to push himself out of a chair or do a pushup without pain.  With these activity describes the pain as 8 or 9 out of 10.  He is still in mild to moderate distress because of the persistent discomfort despite the therapy.       Social History     Occupational History   • Not on file.     Social History Main Topics   • Smoking status: Never Smoker   • Smokeless tobacco: Never Used   • Alcohol use Not on file   • Drug use: No   • Sexual activity: Defer      Review of Systems   Constitutional: Negative for chills, diaphoresis, fever and unexpected weight change.   HENT: Negative for hearing loss, nosebleeds, sore throat and tinnitus.    Eyes: Negative for pain and visual disturbance.   Respiratory: Negative for cough, shortness of breath and wheezing.    Cardiovascular: Negative for chest pain and palpitations.   Gastrointestinal: Negative for abdominal pain, diarrhea, nausea and vomiting.   Endocrine: Negative for cold intolerance, heat intolerance and polydipsia.   Genitourinary: Negative for difficulty urinating, dysuria and hematuria.   Musculoskeletal: Negative for arthralgias, joint swelling and myalgias.   Skin: Negative for rash and wound.   Allergic/Immunologic: Negative for environmental allergies.   Neurological: Negative for dizziness, syncope and numbness.   Hematological: Does not bruise/bleed easily.   Psychiatric/Behavioral: Negative for dysphoric mood and sleep disturbance. The patient is not nervous/anxious.    All other systems reviewed and are negative.        Past Medical History:   Diagnosis Date   •  Arthritis    • Hypercholesteremia    • Rotator cuff syndrome    • Tear of meniscus of knee      Past Surgical History:   Procedure Laterality Date   • COLONOSCOPY N/A 5/27/2016    Procedure: COLONOSCOPY / polypectomy;  Surgeon: Heath Naik MD;  Location: [facility];  Service:    • FRACTURE SURGERY Right     R arm- plate   • HERNIA REPAIR     • TENDON TRANSFER Right     hand   • TONSILLECTOMY       Family History   Problem Relation Age of Onset   • Cancer Father          Objective:  There were no vitals filed for this visit.  There were no vitals filed for this visit.  There is no height or weight on file to calculate BMI.       Ortho Exam  there is no motor deficit good distal pulses right upper extremity no sensory loss.  No tenderness to the upper arm to the forearm musculature.  Some tenderness to palpation over the before meals joint mild discomfort with abduction and extension against resistance.  Minimal pain with internal rotation.  Negative point's test.  No sensory loss.    Assessment:       1. Primary osteoarthritis of left knee    2. Bursitis/tendonitis, shoulder          Plan:      my recommendation at this point is I discussed with the patient in detail to continue the Mobic for another month and then return.  If he is still at that time having symptoms or pain or limitations in activity but is unwilling to live with the methadone to consider the arthroscopic subacromial decompression and Caesar procedure but the indication for surgery as I discussed with them is pain that he is not willing to live with.  The very least since he is making improvement but to continue the Mobic for another month.      Work Status:    SANDHYA query complete.    Orders:  No orders of the defined types were placed in this encounter.      Medications:  New Medications Ordered This Visit   Medications   • meloxicam (MOBIC) 7.5 MG tablet     Sig: Take 1 tablet by mouth Daily.     Dispense:  90 tablet     Refill:   3       Followup:  Return in about 4 weeks (around 5/3/2017).          Dragon transcription disclaimer     Much of this encounter note is an electronic transcription/translation of spoken language to printed text. The electronic translation of spoken language may permit erroneous, or at times, nonsensical words or phrases to be inadvertently transcribed. Although I have reviewed the note for such errors, some may still exist.

## 2017-04-18 DIAGNOSIS — E78.5 HYPERLIPIDEMIA, UNSPECIFIED HYPERLIPIDEMIA TYPE: Primary | ICD-10-CM

## 2017-04-19 ENCOUNTER — LAB (OUTPATIENT)
Dept: INTERNAL MEDICINE | Facility: CLINIC | Age: 62
End: 2017-04-19

## 2017-04-19 DIAGNOSIS — E78.5 HYPERLIPIDEMIA, UNSPECIFIED HYPERLIPIDEMIA TYPE: ICD-10-CM

## 2017-04-19 LAB
ALBUMIN SERPL-MCNC: 3.9 G/DL (ref 3.5–5.2)
ALBUMIN/GLOB SERPL: 2.1 G/DL
ALP SERPL-CCNC: 48 U/L (ref 40–129)
ALT SERPL-CCNC: 16 U/L (ref 5–41)
AST SERPL-CCNC: 16 U/L (ref 5–40)
BASOPHILS # BLD AUTO: 0.04 10*3/MM3 (ref 0–0.2)
BASOPHILS NFR BLD AUTO: 0.9 % (ref 0–2)
BILIRUB SERPL-MCNC: 0.9 MG/DL (ref 0.2–1.2)
BUN SERPL-MCNC: 21 MG/DL (ref 8–23)
BUN/CREAT SERPL: 23.1 (ref 7–25)
CALCIUM SERPL-MCNC: 8.8 MG/DL (ref 8.8–10.5)
CHLORIDE SERPL-SCNC: 104 MMOL/L (ref 98–107)
CHOLEST SERPL-MCNC: 176 MG/DL (ref 0–200)
CHOLEST/HDLC SERPL: 2.79 {RATIO}
CO2 SERPL-SCNC: 29 MMOL/L (ref 22–29)
CREAT SERPL-MCNC: 0.91 MG/DL (ref 0.76–1.27)
EOSINOPHIL # BLD AUTO: 0.21 10*3/MM3 (ref 0.1–0.3)
EOSINOPHIL NFR BLD AUTO: 4.8 % (ref 0–4)
ERYTHROCYTE [DISTWIDTH] IN BLOOD BY AUTOMATED COUNT: 12.6 % (ref 11.5–14.5)
GLOBULIN SER CALC-MCNC: 1.9 GM/DL
GLUCOSE SERPL-MCNC: 98 MG/DL (ref 65–99)
HCT VFR BLD AUTO: 40 % (ref 42–52)
HDLC SERPL-MCNC: 63 MG/DL (ref 40–60)
HGB BLD-MCNC: 13.5 G/DL (ref 14–18)
IMM GRANULOCYTES # BLD: 0.01 10*3/MM3 (ref 0–0.03)
IMM GRANULOCYTES NFR BLD: 0.2 % (ref 0–0.5)
LDLC SERPL CALC-MCNC: 101 MG/DL (ref 0–100)
LYMPHOCYTES # BLD AUTO: 0.86 10*3/MM3 (ref 0.6–4.8)
LYMPHOCYTES NFR BLD AUTO: 19.6 % (ref 20–45)
MCH RBC QN AUTO: 29.2 PG (ref 27–31)
MCHC RBC AUTO-ENTMCNC: 33.8 G/DL (ref 31–37)
MCV RBC AUTO: 86.4 FL (ref 80–94)
MONOCYTES # BLD AUTO: 0.37 10*3/MM3 (ref 0–1)
MONOCYTES NFR BLD AUTO: 8.4 % (ref 3–8)
NEUTROPHILS # BLD AUTO: 2.9 10*3/MM3 (ref 1.5–8.3)
NEUTROPHILS NFR BLD AUTO: 66.1 % (ref 45–70)
NRBC BLD AUTO-RTO: 0 /100 WBC (ref 0–0)
PLATELET # BLD AUTO: 189 10*3/MM3 (ref 140–500)
POTASSIUM SERPL-SCNC: 4.5 MMOL/L (ref 3.5–5.2)
PROT SERPL-MCNC: 5.8 G/DL (ref 6–8.5)
PSA SERPL-MCNC: 0.2 NG/ML (ref 0–4)
RBC # BLD AUTO: 4.63 10*6/MM3 (ref 4.7–6.1)
SODIUM SERPL-SCNC: 142 MMOL/L (ref 136–145)
TRIGL SERPL-MCNC: 58 MG/DL (ref 0–150)
VLDLC SERPL CALC-MCNC: 11.6 MG/DL (ref 8–32)
WBC # BLD AUTO: 4.39 10*3/MM3 (ref 4.8–10.8)

## 2017-04-28 ENCOUNTER — OFFICE VISIT (OUTPATIENT)
Dept: INTERNAL MEDICINE | Facility: CLINIC | Age: 62
End: 2017-04-28

## 2017-04-28 VITALS
WEIGHT: 217 LBS | SYSTOLIC BLOOD PRESSURE: 138 MMHG | BODY MASS INDEX: 28.76 KG/M2 | HEIGHT: 73 IN | HEART RATE: 69 BPM | OXYGEN SATURATION: 98 % | TEMPERATURE: 97.7 F | DIASTOLIC BLOOD PRESSURE: 70 MMHG

## 2017-04-28 DIAGNOSIS — N40.0 BENIGN NON-NODULAR PROSTATIC HYPERPLASIA, PRESENCE OF LOWER URINARY TRACT SYMPTOMS UNSPECIFIED: ICD-10-CM

## 2017-04-28 DIAGNOSIS — E78.5 HYPERLIPIDEMIA LDL GOAL <100: ICD-10-CM

## 2017-04-28 DIAGNOSIS — Z00.00 HEALTH CARE MAINTENANCE: Primary | ICD-10-CM

## 2017-04-28 PROBLEM — K63.5 COLON POLYPS: Status: ACTIVE | Noted: 2017-04-28

## 2017-04-28 LAB
BILIRUB BLD-MCNC: NEGATIVE MG/DL
CLARITY, POC: ABNORMAL
COLOR UR: ABNORMAL
GLUCOSE UR STRIP-MCNC: NEGATIVE MG/DL
KETONES UR QL: ABNORMAL
LEUKOCYTE EST, POC: NEGATIVE
NITRITE UR-MCNC: NEGATIVE MG/ML
PH UR: 5.5 [PH] (ref 5–8)
PROT UR STRIP-MCNC: NEGATIVE MG/DL
RBC # UR STRIP: NEGATIVE /UL
SP GR UR: 1.01 (ref 1–1.03)
UROBILINOGEN UR QL: NORMAL

## 2017-04-28 PROCEDURE — 99396 PREV VISIT EST AGE 40-64: CPT | Performed by: NURSE PRACTITIONER

## 2017-04-28 PROCEDURE — 93000 ELECTROCARDIOGRAM COMPLETE: CPT | Performed by: NURSE PRACTITIONER

## 2017-04-28 PROCEDURE — 81003 URINALYSIS AUTO W/O SCOPE: CPT | Performed by: NURSE PRACTITIONER

## 2017-04-28 NOTE — PATIENT INSTRUCTIONS
Generic Shoulder Exercises  EXERCISES   RANGE OF MOTION (ROM) AND STRETCHING EXERCISES  These exercises may help you when beginning to rehabilitate your injury. Your symptoms may resolve with or without further involvement from your physician, physical therapist or . While completing these exercises, remember:   · Restoring tissue flexibility helps normal motion to return to the joints. This allows healthier, less painful movement and activity.  · An effective stretch should be held for at least 30 seconds.  · A stretch should never be painful. You should only feel a gentle lengthening or release in the stretched tissue.  ROM - Pendulum  · Bend at the waist so that your right / left arm falls away from your body. Support yourself with your opposite hand on a solid surface, such as a table or a countertop.  · Your right / left arm should be perpendicular to the ground. If it is not perpendicular, you need to lean over farther. Relax the muscles in your right / left arm and shoulder as much as possible.  · Gently sway your hips and trunk so they move your right / left arm without any use of your right / left shoulder muscles.  · Progress your movements so that your right / left arm moves side to side, then forward and backward, and finally, both clockwise and counterclockwise.  · Complete __________ repetitions in each direction. Many people use this exercise to relieve discomfort in their shoulder as well as to gain range of motion.  Repeat __________ times. Complete this exercise __________ times per day.  STRETCH - Flexion, Standing  · Stand with good posture. With an underhand  on your right / left hand and an overhand  on the opposite hand, grasp a broomstick or cane so that your hands are a little more than shoulder-width apart.  · Keeping your right / left elbow straight and shoulder muscles relaxed, push the stick with your opposite hand to raise your right / left arm in front of your  body and then overhead. Raise your arm until you feel a stretch in your right / left shoulder, but before you have increased shoulder pain.  · Try to avoid shrugging your right / left shoulder as your arm rises by keeping your shoulder blade tucked down and toward your mid-back spine. Hold __________ seconds.  · Slowly return to the starting position.  Repeat __________ times. Complete this exercise __________ times per day.  STRETCH - Internal Rotation  · Place your right / left hand behind your back, palm-up.  · Throw a towel or belt over your opposite shoulder. Grasp the towel/belt with your right / left hand.  · While keeping an upright posture, gently pull up on the towel/belt until you feel a stretch in the front of your right / left shoulder.  · Avoid shrugging your right / left shoulder as your arm rises by keeping your shoulder blade tucked down and toward your mid-back spine.  · Hold __________. Release the stretch by lowering your opposite hand.  Repeat __________ times. Complete this exercise __________ times per day.  STRETCH - External Rotation and Abduction  · Stagger your stance through a doorframe. It does not matter which foot is forward.  · As instructed by your physician, physical therapist or , place your hands:    And forearms above your head and on the door frame.    And forearms at head-height and on the door frame.    At elbow-height and on the door frame.  · Keeping your head and chest upright and your stomach muscles tight to prevent over-extending your low-back, slowly shift your weight onto your front foot until you feel a stretch across your chest and/or in the front of your shoulders.  · Hold __________ seconds. Shift your weight to your back foot to release the stretch.  Repeat __________ times. Complete this stretch __________ times per day.   STRENGTHENING EXERCISES   These exercises may help you when beginning to rehabilitate your injury. They may resolve your  symptoms with or without further involvement from your physician, physical therapist or . While completing these exercises, remember:   · Muscles can gain both the endurance and the strength needed for everyday activities through controlled exercises.  · Complete these exercises as instructed by your physician, physical therapist or . Progress the resistance and repetitions only as guided.  · You may experience muscle soreness or fatigue, but the pain or discomfort you are trying to eliminate should never worsen during these exercises. If this pain does worsen, stop and make certain you are following the directions exactly. If the pain is still present after adjustments, discontinue the exercise until you can discuss the trouble with your clinician.  · If advised by your physician, during your recovery, avoid activity or exercises which involve actions that place your right / left hand or elbow above your head or behind your back or head. These positions stress the tissues which are trying to heal.  STRENGTH - Scapular Depression and Adduction  · With good posture, sit on a firm chair. Supported your arms in front of you with pillows, arm rests or a table top. Have your elbows in line with the sides of your body.  · Gently draw your shoulder blades down and toward your mid-back spine. Gradually increase the tension without tensing the muscles along the top of your shoulders and the back of your neck.  · Hold for __________ seconds. Slowly release the tension and relax your muscles completely before completing the next repetition.  · After you have practiced this exercise, remove the arm support and complete it in standing as well as sitting.  Repeat __________ times. Complete this exercise __________ times per day.   STRENGTH - External Rotators  · Secure a rubber exercise band/tubing to a fixed object so that it is at the same height as your right / left elbow when you are standing  "or sitting on a firm surface.  · Stand or sit so that the secured exercise band/tubing is at your side that is not injured.  · Bend your elbow 90 degrees. Place a folded towel or small pillow under your right / left arm so that your elbow is a few inches away from your side.  · Keeping the tension on the exercise band/tubing, pull it away from your body, as if pivoting on your elbow. Be sure to keep your body steady so that the movement is only coming from your shoulder rotating.  · Hold __________ seconds. Release the tension in a controlled manner as you return to the starting position.  Repeat __________ times. Complete this exercise __________ times per day.   STRENGTH - Supraspinatus  · Stand or sit with good posture. Grasp a __________ weight or an exercise band/tubing so that your hand is \"thumbs-up,\" like when you shake hands.  · Slowly lift your right / left hand from your thigh into the air, traveling about 30 degrees from straight out at your side. Lift your hand to shoulder height or as far as you can without increasing any shoulder pain. Initially, many people do not lift their hands above shoulder height.  · Avoid shrugging your right / left shoulder as your arm rises by keeping your shoulder blade tucked down and toward your mid-back spine.  · Hold for __________ seconds. Control the descent of your hand as you slowly return to your starting position.  Repeat __________ times. Complete this exercise __________ times per day.   STRENGTH - Shoulder Extensors  · Secure a rubber exercise band/tubing so that it is at the height of your shoulders when you are either standing or sitting on a firm arm-less chair.  · With a thumbs-up , grasp an end of the band/tubing in each hand. Straighten your elbows and lift your hands straight in front of you at shoulder height. Step back away from the secured end of band/tubing until it becomes tense.  · Squeezing your shoulder blades together, pull your hands down " to the sides of your thighs. Do not allow your hands to go behind you.  · Hold for __________ seconds. Slowly ease the tension on the band/tubing as you reverse the directions and return to the starting position.  Repeat __________ times. Complete this exercise __________ times per day.   STRENGTH - Scapular Retractors  · Secure a rubber exercise band/tubing so that it is at the height of your shoulders when you are either standing or sitting on a firm arm-less chair.  · With a palm-down , grasp an end of the band/tubing in each hand. Straighten your elbows and lift your hands straight in front of you at shoulder height. Step back away from the secured end of band/tubing until it becomes tense.  · Squeezing your shoulder blades together, draw your elbows back as you bend them. Keep your upper arm lifted away from your body throughout the exercise.  · Hold __________ seconds. Slowly ease the tension on the band/tubing as you reverse the directions and return to the starting position.  Repeat __________ times. Complete this exercise __________ times per day.  STRENGTH - Scapular Depressors  · Find a sturdy chair without wheels, such as a from a dining room table.  · Keeping your feet on the floor, lift your bottom from the seat and lock your elbows.  · Keeping your elbows straight, allow gravity to pull your body weight down. Your shoulders will rise toward your ears.  · Raise your body against gravity by drawing your shoulder blades down your back, shortening the distance between your shoulders and ears. Although your feet should always maintain contact with the floor, your feet should progressively support less body weight as you get stronger.  · Hold __________ seconds. In a controlled and slow manner, lower your body weight to begin the next repetition.  Repeat __________ times. Complete this exercise __________ times per day.      This information is not intended to replace advice given to you by your health  care provider. Make sure you discuss any questions you have with your health care provider.     Document Released: 11/01/2006 Document Revised: 01/08/2016 Document Reviewed: 08/28/2016  [employer] Interactive Patient Education ©2016 [employer]  Shoulder Arthroscopy  Shoulder arthroscopy is a surgical technique used to evaluate and treat injuries involving the shoulder joint. In this technique, small cuts (incisions) are made in your shoulder. A small, telescope-like instrument with a lighted camera on one end (arthroscope) and surgical instruments are inserted through these incisions into your shoulder joint. This allows your health care provider to look directly into the joint and repair any damage at the same time.   LET YOUR HEALTH CARE PROVIDER KNOW ABOUT:  · Any allergies you have.  · All medicines you are taking, including vitamins, herbs, eye drops, creams, and over-the-counter medicines.  · Previous problems you or members of your family have had with the use of anesthetics.  · Any blood disorders you have.  · Previous surgeries you have had.  · Medical conditions you have.  RISKS AND COMPLICATIONS  Generally, this is a safe procedure. However, problems can occur and include:  · Damage to nerves or blood vessels.  · Excess bleeding.  · Blood clots.  · Infection.  BEFORE THE PROCEDURE   · Ask your health care provider about:    Changing or stopping your regular medicines. This is especially important if you are taking diabetes medicines or blood thinners.    Taking medicines such as aspirin and ibuprofen. These medicines can thin your blood. Do not take these medicines before your procedure if your health care provider asks you not to.  · Do not eat or drink anything after midnight on the night before the procedure or as directed by your health care provider.  · You may have an exam or testing.  PROCEDURE   · You may be given a medicine to make you sleep (general anesthetic) or a medicine to numb the  shoulder area (local anesthetic).  · Several small incisions will be made in your shoulder. Saline fluid will be put in through one of the incisions to expand the joint space so your health care provider can see the area more easily.  · An arthroscope will be inserted into one of the incisions. The arthroscope sends an image to a TV screen so the health care provider can examine your shoulder joint.  · During the procedure, your health care provider may find various problems.  · Tools may be inserted through the other incisions to repair any injuries found. In some cases, the procedure may change to an open surgery if problems are found that cannot be repaired with arthroscopy.  · The incisions will then be closed with stitches or tape.  AFTER THE PROCEDURE   You will be taken to a recovery area where your progress will be watched.      This information is not intended to replace advice given to you by your health care provider. Make sure you discuss any questions you have with your health care provider.     Document Released: 07/15/2015 Document Reviewed: 07/15/2015  [employer] Interactive Patient Education ©2016 [employer]

## 2017-04-28 NOTE — PROGRESS NOTES
Procedure     ECG 12 Lead  Date/Time: 4/28/2017 11:26 AM  Performed by: ALISSON ANAND  Authorized by: ALISSON ANAND   Comparison: compared with previous ECG from 5/27/2016  Rhythm: sinus rhythm  Rate: normal  ST Segments: ST segments normal  T Waves: T waves normal  QRS axis: normal  Other findings comments: MT 0.20 QRS 0.08  Clinical impression: normal ECG

## 2017-04-28 NOTE — PROGRESS NOTES
"Annual Exam      Chad Marte is being seen for a Complete physical exam. His last physical was 3-. He is working as a . He is  . His household includes his wife. He is exercising regularly, push-ups, weights, and EFX. He does not use tobacco. He drinks alcohol once weekly.    Colonoscopy was completed 5- with Dr. Naik, 2 polyps removed. Last labs reviewed from 4- included a lipid panel, PSA, CBC, and CMP. He saw urology 6- for TOMASZ and PSA.       History of Present Illness       The following portions of the patient's history were reviewed and updated as appropriate: allergies, current medications, past family history, past medical history, past social history, past surgical history and problem list.    Review of Systems   Constitutional: Negative.    HENT: Negative.    Eyes: Negative.    Respiratory: Negative.    Cardiovascular: Negative.  Negative for chest pain and leg swelling.   Gastrointestinal: Negative.    Endocrine: Negative.    Genitourinary: Positive for hematuria. Negative for decreased urine volume, frequency and urgency.   Musculoskeletal: Positive for arthralgias.   Allergic/Immunologic: Negative.    Neurological: Negative.    Hematological: Negative.    Psychiatric/Behavioral: Negative.        Objective          /70  Pulse 69  Temp 97.7 °F (36.5 °C)  Ht 73\" (185.4 cm)  Wt 217 lb (98.4 kg)  SpO2 98%  BMI 28.63 kg/m2    General Appearance:    Alert, cooperative, no distress, appears stated age   Head:    Normocephalic, without obvious abnormality, atraumatic   Eyes:    PERRL, conjunctiva/corneas clear, EOM's intact, fundi     benign, both eyes        Ears:    Normal TM's and external ear canals, both ears   Nose:   Nares normal, septum midline, mucosa normal, no drainage    or sinus tenderness   Throat:   Lips, mucosa, and tongue normal; teeth and gums normal   Neck:   Supple, symmetrical, trachea midline, no adenopathy;        " thyroid:  No enlargement/tenderness/nodules; no carotid    bruit or JVD   Back:     Symmetric, no curvature, ROM normal, no CVA tenderness   Lungs:     Clear to auscultation bilaterally, respirations unlabored   Chest wall:    No tenderness or deformity   Heart:    Regular rate and rhythm, S1 and S2 normal, no murmur, rub    or gallop   Abdomen:     Soft, non-tender, bowel sounds active all four quadrants,     no masses, no organomegaly   Genitalia:    Deferred to urology   Rectal:    Deferred to urology   Extremities:   Extremities normal, atraumatic, no cyanosis or edema   Pulses:   2+ and symmetric all extremities   Skin:   Skin color, texture, turgor normal, no rashes or lesions   Lymph nodes:   Cervical, supraclavicular, and axillary nodes normal   Neurologic:   CNII-XII intact. Normal strength, sensation and reflexes      throughout            Assessment/Plan   Chad was seen today for annual exam.    Diagnoses and all orders for this visit:    Health care maintenance  -     ECG 12 Lead  -     POCT urinalysis dipstick, automated    Hyperlipidemia LDL goal <100  -     ECG 12 Lead  -     Comprehensive metabolic panel; Future  -     Conv Lipid Panel w/ Chol/HDL Ratio; Future  -     CBC & Differential; Future    Benign non-nodular prostatic hyperplasia, presence of lower urinary tract symptoms unspecified  -     PSA; Future  -     CBC & Differential; Future

## 2017-05-11 ENCOUNTER — OFFICE VISIT (OUTPATIENT)
Dept: ORTHOPEDIC SURGERY | Facility: CLINIC | Age: 62
End: 2017-05-11

## 2017-05-11 VITALS — WEIGHT: 216 LBS | HEIGHT: 73 IN | BODY MASS INDEX: 28.63 KG/M2

## 2017-05-11 DIAGNOSIS — IMO0002 BURSITIS/TENDONITIS, SHOULDER: ICD-10-CM

## 2017-05-11 DIAGNOSIS — M17.12 PRIMARY OSTEOARTHRITIS OF LEFT KNEE: Primary | ICD-10-CM

## 2017-05-11 PROCEDURE — 99212 OFFICE O/P EST SF 10 MIN: CPT | Performed by: ORTHOPAEDIC SURGERY

## 2017-05-30 ENCOUNTER — LAB (OUTPATIENT)
Dept: INTERNAL MEDICINE | Facility: CLINIC | Age: 62
End: 2017-05-30

## 2017-05-30 DIAGNOSIS — E78.5 HYPERLIPIDEMIA LDL GOAL <100: ICD-10-CM

## 2017-05-30 DIAGNOSIS — N40.0 BENIGN NON-NODULAR PROSTATIC HYPERPLASIA, PRESENCE OF LOWER URINARY TRACT SYMPTOMS UNSPECIFIED: ICD-10-CM

## 2017-05-30 LAB
BASOPHILS # BLD AUTO: 0.03 10*3/MM3 (ref 0–0.2)
BASOPHILS NFR BLD AUTO: 0.7 % (ref 0–2)
EOSINOPHIL # BLD AUTO: 0.22 10*3/MM3 (ref 0.1–0.3)
EOSINOPHIL NFR BLD AUTO: 4.8 % (ref 0–4)
ERYTHROCYTE [DISTWIDTH] IN BLOOD BY AUTOMATED COUNT: 12.3 % (ref 11.5–14.5)
HCT VFR BLD AUTO: 41.6 % (ref 42–52)
HGB BLD-MCNC: 14 G/DL (ref 14–18)
IMM GRANULOCYTES # BLD: 0.02 10*3/MM3 (ref 0–0.03)
IMM GRANULOCYTES NFR BLD: 0.4 % (ref 0–0.5)
LYMPHOCYTES # BLD AUTO: 0.93 10*3/MM3 (ref 0.6–4.8)
LYMPHOCYTES NFR BLD AUTO: 20.3 % (ref 20–45)
MCH RBC QN AUTO: 28.9 PG (ref 27–31)
MCHC RBC AUTO-ENTMCNC: 33.7 G/DL (ref 31–37)
MCV RBC AUTO: 85.8 FL (ref 80–94)
MONOCYTES # BLD AUTO: 0.41 10*3/MM3 (ref 0–1)
MONOCYTES NFR BLD AUTO: 9 % (ref 3–8)
NEUTROPHILS # BLD AUTO: 2.97 10*3/MM3 (ref 1.5–8.3)
NEUTROPHILS NFR BLD AUTO: 64.8 % (ref 45–70)
NRBC BLD AUTO-RTO: 0 /100 WBC (ref 0–0)
PLATELET # BLD AUTO: 198 10*3/MM3 (ref 140–500)
RBC # BLD AUTO: 4.85 10*6/MM3 (ref 4.7–6.1)
WBC # BLD AUTO: 4.58 10*3/MM3 (ref 4.8–10.8)

## 2017-05-31 ENCOUNTER — TELEPHONE (OUTPATIENT)
Dept: INTERNAL MEDICINE | Facility: CLINIC | Age: 62
End: 2017-05-31

## 2017-05-31 ENCOUNTER — DOCUMENTATION (OUTPATIENT)
Dept: PHYSICAL THERAPY | Facility: HOSPITAL | Age: 62
End: 2017-05-31

## 2017-05-31 DIAGNOSIS — IMO0002 BURSITIS/TENDONITIS, SHOULDER: Primary | ICD-10-CM

## 2018-01-24 ENCOUNTER — TELEPHONE (OUTPATIENT)
Dept: INTERNAL MEDICINE | Facility: CLINIC | Age: 63
End: 2018-01-24

## 2018-01-24 RX ORDER — OSELTAMIVIR PHOSPHATE 75 MG/1
75 CAPSULE ORAL DAILY
Qty: 10 CAPSULE | Refills: 0 | Status: SHIPPED | OUTPATIENT
Start: 2018-01-24 | End: 2018-04-30

## 2018-01-24 NOTE — TELEPHONE ENCOUNTER
Patients wife tested positive for flu, prophylaxis Tamiflu sent into pharmacy for him by Dr. Rosalva Luis.

## 2018-04-20 DIAGNOSIS — E78.5 HYPERLIPIDEMIA, UNSPECIFIED HYPERLIPIDEMIA TYPE: Primary | ICD-10-CM

## 2018-04-20 DIAGNOSIS — Z98.890 H/O PROSTATE BIOPSY: ICD-10-CM

## 2018-04-20 DIAGNOSIS — M15.9 GENERALIZED OSTEOARTHRITIS: ICD-10-CM

## 2018-04-20 DIAGNOSIS — Z12.5 PROSTATE CANCER SCREENING: ICD-10-CM

## 2018-04-25 ENCOUNTER — RESULTS ENCOUNTER (OUTPATIENT)
Dept: INTERNAL MEDICINE | Facility: CLINIC | Age: 63
End: 2018-04-25

## 2018-04-25 DIAGNOSIS — E78.5 HYPERLIPIDEMIA, UNSPECIFIED HYPERLIPIDEMIA TYPE: ICD-10-CM

## 2018-04-25 DIAGNOSIS — Z12.5 PROSTATE CANCER SCREENING: ICD-10-CM

## 2018-04-25 DIAGNOSIS — M15.9 GENERALIZED OSTEOARTHRITIS: ICD-10-CM

## 2018-04-25 DIAGNOSIS — Z98.890 H/O PROSTATE BIOPSY: ICD-10-CM

## 2018-04-26 LAB
ALBUMIN SERPL-MCNC: 3.9 G/DL (ref 3.5–5.2)
ALBUMIN/GLOB SERPL: 1.6 G/DL
ALP SERPL-CCNC: 50 U/L (ref 40–129)
ALT SERPL-CCNC: 14 U/L (ref 5–41)
AST SERPL-CCNC: 15 U/L (ref 5–40)
BASOPHILS # BLD AUTO: 0.03 10*3/MM3 (ref 0–0.2)
BASOPHILS NFR BLD AUTO: 0.7 % (ref 0–2)
BILIRUB SERPL-MCNC: 0.9 MG/DL (ref 0.2–1.2)
BUN SERPL-MCNC: 23 MG/DL (ref 8–23)
BUN/CREAT SERPL: 25.8 (ref 7–25)
CALCIUM SERPL-MCNC: 9 MG/DL (ref 8.8–10.5)
CHLORIDE SERPL-SCNC: 103 MMOL/L (ref 98–107)
CHOLEST SERPL-MCNC: 170 MG/DL (ref 0–200)
CHOLEST/HDLC SERPL: 3.04 {RATIO}
CO2 SERPL-SCNC: 29.4 MMOL/L (ref 22–29)
CREAT SERPL-MCNC: 0.89 MG/DL (ref 0.76–1.27)
EOSINOPHIL # BLD AUTO: 0.14 10*3/MM3 (ref 0.1–0.3)
EOSINOPHIL NFR BLD AUTO: 3 % (ref 0–4)
ERYTHROCYTE [DISTWIDTH] IN BLOOD BY AUTOMATED COUNT: 12.8 % (ref 11.5–14.5)
GFR SERPLBLD CREATININE-BSD FMLA CKD-EPI: 104 ML/MIN/1.73
GFR SERPLBLD CREATININE-BSD FMLA CKD-EPI: 86 ML/MIN/1.73
GLOBULIN SER CALC-MCNC: 2.5 GM/DL
GLUCOSE SERPL-MCNC: 87 MG/DL (ref 65–99)
HCT VFR BLD AUTO: 42.2 % (ref 42–52)
HDLC SERPL-MCNC: 56 MG/DL (ref 40–60)
HGB BLD-MCNC: 14.3 G/DL (ref 14–18)
IMM GRANULOCYTES # BLD: 0 10*3/MM3 (ref 0–0.03)
IMM GRANULOCYTES NFR BLD: 0 % (ref 0–0.5)
LDLC SERPL CALC-MCNC: 102 MG/DL (ref 0–100)
LYMPHOCYTES # BLD AUTO: 1.38 10*3/MM3 (ref 0.6–4.8)
LYMPHOCYTES NFR BLD AUTO: 30 % (ref 20–45)
MCH RBC QN AUTO: 29.9 PG (ref 27–31)
MCHC RBC AUTO-ENTMCNC: 33.9 G/DL (ref 31–37)
MCV RBC AUTO: 88.1 FL (ref 80–94)
MONOCYTES # BLD AUTO: 0.39 10*3/MM3 (ref 0–1)
MONOCYTES NFR BLD AUTO: 8.5 % (ref 3–8)
NEUTROPHILS # BLD AUTO: 2.66 10*3/MM3 (ref 1.5–8.3)
NEUTROPHILS NFR BLD AUTO: 57.8 % (ref 45–70)
NRBC BLD AUTO-RTO: 0 /100 WBC (ref 0–0)
PLATELET # BLD AUTO: 184 10*3/MM3 (ref 140–500)
POTASSIUM SERPL-SCNC: 5 MMOL/L (ref 3.5–5.2)
PROT SERPL-MCNC: 6.4 G/DL (ref 6–8.5)
PSA SERPL-MCNC: 0.5 NG/ML (ref 0–4)
RBC # BLD AUTO: 4.79 10*6/MM3 (ref 4.7–6.1)
SODIUM SERPL-SCNC: 141 MMOL/L (ref 136–145)
TRIGL SERPL-MCNC: 59 MG/DL (ref 0–150)
VLDLC SERPL CALC-MCNC: 11.8 MG/DL (ref 8–32)
WBC # BLD AUTO: 4.6 10*3/MM3 (ref 4.8–10.8)

## 2018-04-30 ENCOUNTER — OFFICE VISIT (OUTPATIENT)
Dept: INTERNAL MEDICINE | Facility: CLINIC | Age: 63
End: 2018-04-30

## 2018-04-30 VITALS
BODY MASS INDEX: 27.17 KG/M2 | TEMPERATURE: 97.9 F | OXYGEN SATURATION: 98 % | SYSTOLIC BLOOD PRESSURE: 132 MMHG | HEART RATE: 68 BPM | WEIGHT: 205 LBS | DIASTOLIC BLOOD PRESSURE: 76 MMHG | HEIGHT: 73 IN | RESPIRATION RATE: 16 BRPM

## 2018-04-30 DIAGNOSIS — Z00.00 WELCOME TO MEDICARE PREVENTIVE VISIT: Primary | ICD-10-CM

## 2018-04-30 DIAGNOSIS — Z11.59 NEED FOR HEPATITIS C SCREENING TEST: ICD-10-CM

## 2018-04-30 DIAGNOSIS — E78.5 HYPERLIPIDEMIA LDL GOAL <100: ICD-10-CM

## 2018-04-30 DIAGNOSIS — Z12.5 SCREENING FOR PROSTATE CANCER: ICD-10-CM

## 2018-04-30 LAB
BILIRUB BLD-MCNC: NEGATIVE MG/DL
CLARITY, POC: CLEAR
COLOR UR: YELLOW
GLUCOSE UR STRIP-MCNC: NEGATIVE MG/DL
KETONES UR QL: NEGATIVE
LEUKOCYTE EST, POC: NEGATIVE
NITRITE UR-MCNC: NEGATIVE MG/ML
PH UR: 5 [PH] (ref 5–8)
PROT UR STRIP-MCNC: NEGATIVE MG/DL
RBC # UR STRIP: ABNORMAL /UL
SP GR UR: 1.02 (ref 1–1.03)
UROBILINOGEN UR QL: NORMAL

## 2018-04-30 PROCEDURE — G0402 INITIAL PREVENTIVE EXAM: HCPCS | Performed by: NURSE PRACTITIONER

## 2018-04-30 PROCEDURE — 81003 URINALYSIS AUTO W/O SCOPE: CPT | Performed by: NURSE PRACTITIONER

## 2018-04-30 PROCEDURE — 96160 PT-FOCUSED HLTH RISK ASSMT: CPT | Performed by: NURSE PRACTITIONER

## 2018-04-30 RX ORDER — PRAVASTATIN SODIUM 40 MG
40 TABLET ORAL DAILY
Qty: 90 TABLET | Refills: 3 | Status: SHIPPED | OUTPATIENT
Start: 2018-04-30 | End: 2019-04-30

## 2018-04-30 NOTE — PROGRESS NOTES
QUICK REFERENCE INFORMATION:  The ABCs of Providing the Annual Wellness Visit   CMS.[facility]    Medicare Annual Wellness Visit      Subjective   History of Present Illness    Chad Marte is a 63 y.o. male who presents for an Annual Wellness Visit. In addition, we addressed the following health issues:  Hyperlipidemia      PMH, PSH, SocHx, FamHx, Allergies, and Medications: Reviewed and updated in the Visit Navigator.     Outpatient Medications Prior to Visit   Medication Sig Dispense Refill   • pravastatin (PRAVACHOL) 40 MG tablet TAKE 1 TABLET DAILY 90 tablet 2   • finasteride (PROSCAR) 5 MG tablet      • Glucosamine & Fish Oil [id number] MG capsule Take  by mouth.     • Omega-3 Fatty Acids (FISH OIL) 1000 MG capsule capsule Take  by mouth.     • oseltamivir (TAMIFLU) 75 MG capsule Take 1 capsule by mouth Daily. 10 capsule 0     No facility-administered medications prior to visit.        Patient Active Problem List   Diagnosis   • Generalized osteoarthritis   • Hematuria   • Hyperlipidemia   • Proteinuria   • Preventative health care   • Health care maintenance   • H/O prostate biopsy   • Primary osteoarthritis of left knee   • Colon polyps   • Hyperlipidemia LDL goal <100       Health Habits:  Dental Exam. up to date  Eye Exam. up to date  Exercise: 5 times/week.  Current exercise activities include: Walking daily 4 miles, and  weightlifting and EFX    Social:  See review in SnapShot activity and in SocHx section of Visit Navigator.    Health Risk Assessment:  The patient has completed a Health Risk Assessment. This has been reviewed with them and has been scanned into  as a separate document.    Current Medical Providers:  Patient Care Team:  JOSSELYN Anderson as PCP - General    The [facility] providers who are involved in the care of this patient are listed above. Additional providers and suppliers are listed below:  Dr. Naik (GI)  Dr. Lopez (urology) for hematuria    Recent  Hospitalizations:  No hospitalization(s) within the last year..    Age-appropriate Screening Schedule:  Refer to the list below for future screening recommendations based on patient's age. Orders for these recommended tests are listed in the plan section. The patient has been provided with a written plan.    Health Maintenance   Topic Date Due   • HEPATITIS C SCREENING  02/05/2016   • TDAP/TD VACCINES (2 - Td) 01/01/2017   • PNEUMOCOCCAL VACCINES (65+ LOW/MEDIUM RISK) (1 of 2 - PCV13) 02/26/2018   • INFLUENZA VACCINE  08/01/2018   • LIPID PANEL  04/26/2019   • MEDICARE ANNUAL WELLNESS  04/30/2019   • COLONOSCOPY  05/27/2026   • ZOSTER VACCINE  Completed       Depression Screen:   PHQ-2/PHQ-9 Depression Screening 4/30/2018   Little interest or pleasure in doing things 0   Feeling down, depressed, or hopeless 0   Total Score 0       Functional and Cognitive Screening:  Functional & Cognitive Status 4/30/2018   Do you have difficulty preparing food and eating? No   Do you have difficulty bathing yourself, getting dressed or grooming yourself? No   Do you have difficulty using the toilet? No   Do you have difficulty moving around from place to place? No   Do you have trouble with steps or getting out of a bed or a chair? No   In the past year have you fallen or experienced a near fall? No   Current Diet Well Balanced Diet   Dental Exam Up to date   Eye Exam Up to date   Exercise (times per week) 4 times per week   Current Exercise Activities Include Weightlifting   Do you need help using the phone?  No   Are you deaf or do you have serious difficulty hearing?  No   Do you need help with transportation? No   Do you need help shopping? No   Do you need help preparing meals?  No   Do you need help with housework?  No   Do you need help with laundry? No   Do you need help taking your medications? No   Do you need help managing money? No   Do you ever drive or ride in a car without wearing a seat belt? No   Have you felt  "unusual stress, anger or loneliness in the last month? No   Who do you live with? Spouse   If you need help, do you have trouble finding someone available to you? No   Have you been bothered in the last four weeks by sexual problems? No   Do you have difficulty concentrating, remembering or making decisions? No       Does the patient have evidence of cognitive impairment? No    Advanced Care Planning:  has NO advance directive - information provided to the patient today     He believes he has one with hospital on file    Identification of Risk Factors:  Risk factors include: cardiovascular risk.    Review of Systems   Constitutional: Negative.    HENT: Negative.    Eyes: Negative.    Respiratory: Negative.    Cardiovascular: Negative.  Negative for chest pain, palpitations and leg swelling.   Gastrointestinal: Negative.    Endocrine: Negative.    Genitourinary: Positive for hematuria.   Musculoskeletal: Positive for arthralgias.   Skin: Negative.    Allergic/Immunologic: Positive for environmental allergies.   Neurological: Negative.    Hematological: Negative.    Psychiatric/Behavioral: Negative.            Objective     Vitals:    04/30/18 0754   BP: 132/76   BP Location: Left arm   Patient Position: Sitting   Cuff Size: Adult   Pulse: 68   Resp: 16   Temp: 97.9 °F (36.6 °C)   TempSrc: Oral   SpO2: 98%   Weight: 93 kg (205 lb)   Height: 185.4 cm (72.99\")   PainSc:   2   PainLoc: Generalized     /76 (BP Location: Left arm, Patient Position: Sitting, Cuff Size: Adult)   Pulse 68   Temp 97.9 °F (36.6 °C) (Oral)   Resp 16   Ht 185.4 cm (72.99\")   Wt 93 kg (205 lb)   SpO2 98%   BMI 27.05 kg/m²     General Appearance:    Alert, cooperative, no distress, appears stated age   Head:    Normocephalic, without obvious abnormality, atraumatic   Eyes:    PERRL, conjunctiva/corneas clear, EOM's intact, fundi     benign, both eyes        Ears:    Normal TM's and external ear canals, both ears   Nose:   Nares normal, " septum midline, mucosa normal, no drainage    or sinus tenderness   Throat:   Lips, mucosa, and tongue normal; teeth and gums normal   Neck:   Supple, symmetrical, trachea midline, no adenopathy;        thyroid:  No enlargement/tenderness/nodules; no carotid    bruit or JVD   Back:     Symmetric, no curvature, ROM normal, no CVA tenderness   Lungs:     Clear to auscultation bilaterally, respirations unlabored   Chest wall:    No tenderness or deformity   Heart:    Regular rate and rhythm, S1 and S2 normal, no murmur, rub    or gallop   Abdomen:     Soft, non-tender, bowel sounds active all four quadrants,     no masses, no organomegaly   Genitalia:    deferred   Rectal:    deferred   Extremities:   Extremities normal, atraumatic, no cyanosis or edema   Pulses:   2+ and symmetric all extremities   Skin:   Skin color, texture, turgor normal, no rashes or lesions   Lymph nodes:   Cervical, supraclavicular, and axillary nodes normal   Neurologic:   CNII-XII intact. Normal strength, sensation and reflexes       throughout     Body mass index is 27.05 kg/m².    Assessment/Plan   Patient Self-Management and Personalized Health Advice  The patient has been provided with information about: exercise and prevention of cardiac or vascular disease and preventive services including:   · Advance directive, Colorectal cancer screening, colonoscopy referral placed, Pneumococcal vaccine , Screening electrocardiogram, TdaP vaccine.    Discussed the patient's BMI with him. The BMI is in the acceptable range.    Orders:     Diagnosis Plan   1. Welcome to Medicare preventive visit  POC Urinalysis Dipstick, Automated   2. Hyperlipidemia LDL goal <100  POC Urinalysis Dipstick, Automated    pravastatin (PRAVACHOL) 40 MG tablet    CBC & Differential    Comprehensive metabolic panel    Conv Lipid Panel w/ Chol/HDL Ratio   3. Screening for prostate cancer  PSA SCREENING       Follow Up:    1 year AWV    An After Visit Summary and PPPS with all  of these plans were given to the patient.

## 2018-05-05 ENCOUNTER — RESULTS ENCOUNTER (OUTPATIENT)
Dept: INTERNAL MEDICINE | Facility: CLINIC | Age: 63
End: 2018-05-05

## 2018-05-05 DIAGNOSIS — Z11.59 NEED FOR HEPATITIS C SCREENING TEST: ICD-10-CM

## 2018-07-10 ENCOUNTER — TRANSCRIBE ORDERS (OUTPATIENT)
Dept: ADMINISTRATIVE | Facility: HOSPITAL | Age: 63
End: 2018-07-10

## 2018-07-10 DIAGNOSIS — R31.0 GROSS HEMATURIA: Primary | ICD-10-CM

## 2018-07-16 ENCOUNTER — HOSPITAL ENCOUNTER (OUTPATIENT)
Dept: CT IMAGING | Facility: HOSPITAL | Age: 63
Discharge: HOME | End: 2018-07-16
Attending: UROLOGY | Admitting: UROLOGY

## 2018-07-16 DIAGNOSIS — R31.0 GROSS HEMATURIA: ICD-10-CM

## 2018-07-16 PROCEDURE — 74178 CT ABD&PLV WO CNTR FLWD CNTR: CPT

## 2018-07-16 PROCEDURE — 0 DIATRIZOATE MEGLUMINE & SODIUM PER 1 ML: Performed by: UROLOGY

## 2018-07-16 PROCEDURE — 0 IOPAMIDOL PER 1 ML: Performed by: UROLOGY

## 2018-07-16 RX ADMIN — IOPAMIDOL 100 ML: 755 INJECTION, SOLUTION INTRAVENOUS at 14:45

## 2018-09-24 ENCOUNTER — CLINICAL SUPPORT (OUTPATIENT)
Dept: INTERNAL MEDICINE | Facility: CLINIC | Age: 63
End: 2018-09-24

## 2018-09-24 DIAGNOSIS — Z23 NEED FOR INFLUENZA VACCINATION: Primary | ICD-10-CM

## 2018-09-24 PROCEDURE — 90662 IIV NO PRSV INCREASED AG IM: CPT | Performed by: NURSE PRACTITIONER

## 2018-09-24 PROCEDURE — G0008 ADMIN INFLUENZA VIRUS VAC: HCPCS | Performed by: NURSE PRACTITIONER

## 2019-03-18 ENCOUNTER — OFFICE VISIT (OUTPATIENT)
Dept: ORTHOPEDIC SURGERY | Facility: CLINIC | Age: 64
End: 2019-03-18

## 2019-03-18 VITALS — BODY MASS INDEX: 27.83 KG/M2 | WEIGHT: 210 LBS | HEIGHT: 73 IN

## 2019-03-18 DIAGNOSIS — R52 PAIN: Primary | ICD-10-CM

## 2019-03-18 DIAGNOSIS — IMO0002 BURSITIS/TENDONITIS, SHOULDER: ICD-10-CM

## 2019-03-18 PROCEDURE — 99213 OFFICE O/P EST LOW 20 MIN: CPT | Performed by: ORTHOPAEDIC SURGERY

## 2019-03-18 PROCEDURE — 20610 DRAIN/INJ JOINT/BURSA W/O US: CPT | Performed by: ORTHOPAEDIC SURGERY

## 2019-03-18 RX ORDER — TRIAMCINOLONE ACETONIDE 40 MG/ML
40 INJECTION, SUSPENSION INTRA-ARTICULAR; INTRAMUSCULAR
Status: COMPLETED | OUTPATIENT
Start: 2019-03-18 | End: 2019-03-18

## 2019-03-18 RX ORDER — MELOXICAM 7.5 MG/1
7.5 TABLET ORAL DAILY
Qty: 30 TABLET | Refills: 5 | Status: SHIPPED | OUTPATIENT
Start: 2019-03-18 | End: 2021-06-18 | Stop reason: SDUPTHER

## 2019-03-18 RX ORDER — LIDOCAINE HYDROCHLORIDE 10 MG/ML
4 INJECTION, SOLUTION EPIDURAL; INFILTRATION; INTRACAUDAL; PERINEURAL
Status: COMPLETED | OUTPATIENT
Start: 2019-03-18 | End: 2019-03-18

## 2019-03-18 RX ADMIN — TRIAMCINOLONE ACETONIDE 40 MG: 40 INJECTION, SUSPENSION INTRA-ARTICULAR; INTRAMUSCULAR at 09:00

## 2019-03-18 RX ADMIN — LIDOCAINE HYDROCHLORIDE 4 ML: 10 INJECTION, SOLUTION EPIDURAL; INFILTRATION; INTRACAUDAL; PERINEURAL at 09:00

## 2019-03-18 NOTE — PROGRESS NOTES
Subjective: Right shoulder pain     Patient ID: Chad Marte is a 64 y.o. male.    Chief Complaint:    History of Present Illness 64-year-old male known to me although has not been seen for 2 years returns with complaint once again of right shoulder pain.  Previously treated for his subacromial bursitis and anti-inflammatory which helped recently is having increased discomfort despite the medication.  No new history of trauma.       Social History     Occupational History   • Not on file   Tobacco Use   • Smoking status: Never Smoker   • Smokeless tobacco: Never Used   Substance and Sexual Activity   • Alcohol use: Not on file   • Drug use: No   • Sexual activity: Defer      Review of Systems   Constitutional: Negative for chills, diaphoresis, fever and unexpected weight change.   HENT: Negative for hearing loss, nosebleeds, sore throat and tinnitus.    Eyes: Negative for pain and visual disturbance.   Respiratory: Negative for cough, shortness of breath and wheezing.    Cardiovascular: Negative for chest pain and palpitations.   Gastrointestinal: Negative for abdominal pain, diarrhea, nausea and vomiting.   Endocrine: Negative for cold intolerance, heat intolerance and polydipsia.   Genitourinary: Negative for difficulty urinating, dysuria and hematuria.   Musculoskeletal: Positive for arthralgias and myalgias. Negative for joint swelling.   Skin: Negative for rash and wound.   Allergic/Immunologic: Negative for environmental allergies.   Neurological: Negative for dizziness, syncope and numbness.   Hematological: Does not bruise/bleed easily.   Psychiatric/Behavioral: Negative for dysphoric mood and sleep disturbance. The patient is not nervous/anxious.          Past Medical History:   Diagnosis Date   • Arthritis    • Hypercholesteremia    • Rotator cuff syndrome    • Tear of meniscus of knee      Past Surgical History:   Procedure Laterality Date   • COLONOSCOPY N/A 5/27/2016    Procedure: COLONOSCOPY /  polypectomy;  Surgeon: Heath Naik MD;  Location: [facility];  Service:    • FRACTURE SURGERY Right     R arm- plate   • HERNIA REPAIR     • TENDON TRANSFER Right     hand   • TONSILLECTOMY       Family History   Problem Relation Age of Onset   • Cancer Father          Objective:  There were no vitals filed for this visit.      03/18/19  0844   Weight: 95.3 kg (210 lb)     Body mass index is 27.71 kg/m².        Ortho Exam   He is alert and oriented x3.  The right upper extremity has no motor deficit is large radial ulnar median nerve function and no sensory loss.  Full range of motion of the elbow for flexion extension pronation supination.  He can extend the shoulder approximately 160s to 170 degrees and abduct approximately 160 degrees with mild pain at the endpoint.  Abduction extension at 90 degrees is intact.  Mildly positive Williamson sign.  External rotation is 45 degrees internal rotation is 35 degrees.  There is good distal pulses good capillary refill the skin is cool to touch there is no swelling or erythema noted.    Assessment:        1. Pain    2. Bursitis/tendonitis, shoulder           Plan: Over 10 minutes was spent the patient face-to-face reviewing his physical findings.  Again he has subacromial bursitis and tendinitis.  Do not believe he has any significant tendon pathology as I discussed with the patient.  At this point I recommend a cortisone injection and he was in agreement.  Blood right shoulder was therefore injected through the posterior portal 4 cc lidocaine 1 cc Kenalog.  Postinjection instructions given to the patient.  I discussed with him these injections can be repeated every 5-6 months.  Refilled his meloxicam.  Return to see me as needed  Large Joint Arthrocentesis: R subacromial bursa  Date/Time: 3/18/2019 9:00 AM  Consent given by: patient  Site marked: site marked  Timeout: Immediately prior to procedure a time out was called to verify the correct patient, procedure,  equipment, support staff and site/side marked as required   Supporting Documentation  Indications: pain   Procedure Details  Location: shoulder - R subacromial bursa  Preparation: Patient was prepped and draped in the usual sterile fashion  Needle size: 22 G  Approach: anterior  Medications administered: 4 mL lidocaine PF 1% 1 %; 40 mg triamcinolone acetonide 40 MG/ML  Patient tolerance: patient tolerated the procedure well with no immediate complications                    Work Status:    SANDHYA query complete.    Orders:  Orders Placed This Encounter   Procedures   • Large Joint Arthrocentesis: R subacromial bursa       Medications:  New Medications Ordered This Visit   Medications   • meloxicam (MOBIC) 7.5 MG tablet     Sig: Take 1 tablet by mouth Daily.     Dispense:  30 tablet     Refill:  5       Followup:  Return if symptoms worsen or fail to improve.          Dictated utilizing Dragon dictation

## 2019-04-23 ENCOUNTER — LAB (OUTPATIENT)
Dept: INTERNAL MEDICINE | Facility: CLINIC | Age: 64
End: 2019-04-23

## 2019-04-23 DIAGNOSIS — E78.5 HYPERLIPIDEMIA LDL GOAL <100: ICD-10-CM

## 2019-04-23 DIAGNOSIS — Z12.5 SCREENING FOR PROSTATE CANCER: ICD-10-CM

## 2019-04-24 LAB
ALBUMIN SERPL-MCNC: 4.3 G/DL (ref 3.5–5.2)
ALBUMIN/GLOB SERPL: 2 G/DL
ALP SERPL-CCNC: 43 U/L (ref 39–117)
ALT SERPL-CCNC: 17 U/L (ref 1–41)
AST SERPL-CCNC: 13 U/L (ref 1–40)
BASOPHILS # BLD AUTO: 0.02 10*3/MM3 (ref 0–0.2)
BASOPHILS NFR BLD AUTO: 0.6 % (ref 0–1.5)
BILIRUB SERPL-MCNC: 0.6 MG/DL (ref 0.2–1.2)
BUN SERPL-MCNC: 24 MG/DL (ref 8–23)
BUN/CREAT SERPL: 28.2 (ref 7–25)
CALCIUM SERPL-MCNC: 9.4 MG/DL (ref 8.6–10.5)
CHLORIDE SERPL-SCNC: 104 MMOL/L (ref 98–107)
CHOLEST SERPL-MCNC: 231 MG/DL (ref 0–200)
CHOLEST/HDLC SERPL: 3.67 {RATIO}
CO2 SERPL-SCNC: 29 MMOL/L (ref 22–29)
CREAT SERPL-MCNC: 0.85 MG/DL (ref 0.76–1.27)
EOSINOPHIL # BLD AUTO: 0.07 10*3/MM3 (ref 0–0.4)
EOSINOPHIL NFR BLD AUTO: 2 % (ref 0.3–6.2)
ERYTHROCYTE [DISTWIDTH] IN BLOOD BY AUTOMATED COUNT: 12.9 % (ref 12.3–15.4)
GLOBULIN SER CALC-MCNC: 2.2 GM/DL
GLUCOSE SERPL-MCNC: 97 MG/DL (ref 65–99)
HCT VFR BLD AUTO: 43.5 % (ref 37.5–51)
HDLC SERPL-MCNC: 63 MG/DL (ref 40–60)
HGB BLD-MCNC: 14 G/DL (ref 13–17.7)
IMM GRANULOCYTES # BLD AUTO: 0.01 10*3/MM3 (ref 0–0.05)
IMM GRANULOCYTES NFR BLD AUTO: 0.3 % (ref 0–0.5)
LDLC SERPL CALC-MCNC: 157 MG/DL (ref 0–100)
LYMPHOCYTES # BLD AUTO: 1.26 10*3/MM3 (ref 0.7–3.1)
LYMPHOCYTES NFR BLD AUTO: 36.5 % (ref 19.6–45.3)
MCH RBC QN AUTO: 28.9 PG (ref 26.6–33)
MCHC RBC AUTO-ENTMCNC: 32.2 G/DL (ref 31.5–35.7)
MCV RBC AUTO: 89.9 FL (ref 79–97)
MONOCYTES # BLD AUTO: 0.32 10*3/MM3 (ref 0.1–0.9)
MONOCYTES NFR BLD AUTO: 9.3 % (ref 5–12)
NEUTROPHILS # BLD AUTO: 1.77 10*3/MM3 (ref 1.7–7)
NEUTROPHILS NFR BLD AUTO: 51.3 % (ref 42.7–76)
NRBC BLD AUTO-RTO: 0 /100 WBC (ref 0–0.2)
PLATELET # BLD AUTO: 169 10*3/MM3 (ref 140–450)
POTASSIUM SERPL-SCNC: 4.6 MMOL/L (ref 3.5–5.2)
PROT SERPL-MCNC: 6.5 G/DL (ref 6–8.5)
PSA SERPL-MCNC: 0.48 NG/ML (ref 0–4)
RBC # BLD AUTO: 4.84 10*6/MM3 (ref 4.14–5.8)
SODIUM SERPL-SCNC: 140 MMOL/L (ref 136–145)
TRIGL SERPL-MCNC: 56 MG/DL (ref 0–150)
VLDLC SERPL CALC-MCNC: 11.2 MG/DL
WBC # BLD AUTO: 3.45 10*3/MM3 (ref 3.4–10.8)

## 2019-04-30 ENCOUNTER — OFFICE VISIT (OUTPATIENT)
Dept: INTERNAL MEDICINE | Facility: CLINIC | Age: 64
End: 2019-04-30

## 2019-04-30 VITALS
WEIGHT: 209 LBS | BODY MASS INDEX: 27.7 KG/M2 | DIASTOLIC BLOOD PRESSURE: 80 MMHG | HEIGHT: 73 IN | RESPIRATION RATE: 16 BRPM | HEART RATE: 65 BPM | OXYGEN SATURATION: 99 % | TEMPERATURE: 97.9 F | SYSTOLIC BLOOD PRESSURE: 124 MMHG

## 2019-04-30 DIAGNOSIS — E78.5 HYPERLIPIDEMIA LDL GOAL <100: ICD-10-CM

## 2019-04-30 DIAGNOSIS — Z23 IMMUNIZATION DUE: ICD-10-CM

## 2019-04-30 DIAGNOSIS — Z00.00 ENCOUNTER FOR MEDICARE ANNUAL WELLNESS EXAM: Primary | ICD-10-CM

## 2019-04-30 PROCEDURE — 99212 OFFICE O/P EST SF 10 MIN: CPT | Performed by: NURSE PRACTITIONER

## 2019-04-30 PROCEDURE — G0009 ADMIN PNEUMOCOCCAL VACCINE: HCPCS | Performed by: NURSE PRACTITIONER

## 2019-04-30 PROCEDURE — G0438 PPPS, INITIAL VISIT: HCPCS | Performed by: NURSE PRACTITIONER

## 2019-04-30 PROCEDURE — 96160 PT-FOCUSED HLTH RISK ASSMT: CPT | Performed by: NURSE PRACTITIONER

## 2019-04-30 PROCEDURE — 90715 TDAP VACCINE 7 YRS/> IM: CPT | Performed by: NURSE PRACTITIONER

## 2019-04-30 PROCEDURE — 90472 IMMUNIZATION ADMIN EACH ADD: CPT | Performed by: NURSE PRACTITIONER

## 2019-04-30 PROCEDURE — 90670 PCV13 VACCINE IM: CPT | Performed by: NURSE PRACTITIONER

## 2019-04-30 RX ORDER — ROSUVASTATIN CALCIUM 10 MG/1
10 TABLET, COATED ORAL NIGHTLY
Qty: 90 TABLET | Refills: 2 | Status: SHIPPED | OUTPATIENT
Start: 2019-04-30 | End: 2019-08-13 | Stop reason: SDUPTHER

## 2019-04-30 NOTE — PROGRESS NOTES
QUICK REFERENCE INFORMATION:  The ABCs of Providing the Annual Wellness Visit   CMS.[facility]    Medicare Subsequent Wellness Visit      Subjective   History of Present Illness    Chad Marte is a 64 y.o. male who presents for an Subsequent Wellness Visit. In addition, we addressed the following health issues:    Hyperlipidemia    She is taking Pravachol 40 mg nightly for cholesterol. He is on a keto diet and he has lost 12 pounds.     He is followed by Dr. Amilcar Lopez for gross hematuria and Dr. Keene for OA shoulder. His last c-scope was with Dr. Naik 4-.       PMH, PSH, SocHx, FamHx, Allergies, and Medications: Reviewed and updated in the Visit Navigator.     Outpatient Medications Prior to Visit   Medication Sig Dispense Refill   • meloxicam (MOBIC) 7.5 MG tablet Take 1 tablet by mouth Daily. 30 tablet 5   • pravastatin (PRAVACHOL) 40 MG tablet Take 1 tablet by mouth Daily. 90 tablet 3     No facility-administered medications prior to visit.        Patient Active Problem List   Diagnosis   • Generalized osteoarthritis   • Hematuria   • Proteinuria   • Welcome to Medicare preventive visit   • H/O prostate biopsy   • Primary osteoarthritis of left knee   • Colon polyps   • Hyperlipidemia LDL goal <100       Health Habits:  Dental Exam. up to date  Eye Exam. up to date  Exercise: 5 times/week.  Current exercise activities include: EFX, weights, push ups    Social:  See review in SnapShot activity and in SocHx section of Visit Navigator.    Health Risk Assessment:  The patient has completed a Health Risk Assessment. This has been reviewed with them and has been scanned into  as a separate document.    Current Medical Providers:  Patient Care Team:  Rita Hawley APRN as PCP - General    The [facility] providers who are involved in the care of this patient are listed above. Additional providers and suppliers are listed below:  Dr. John Hernandez  Hospitalizations:  No recent hospitalization(s)..    Age-appropriate Screening Schedule:  Refer to the list below for future screening recommendations based on patient's age. Orders for these recommended tests are listed in the plan section. The patient has been provided with a written plan.    Health Maintenance   Topic Date Due   • ZOSTER VACCINE (2 of 3) 05/16/2014   • HEPATITIS C SCREENING  02/05/2016   • TDAP/TD VACCINES (2 - Td) 01/01/2017   • PNEUMOCOCCAL VACCINES (65+ LOW/MEDIUM RISK) (1 of 2 - PCV13) 02/26/2018   • MEDICARE ANNUAL WELLNESS  04/30/2019   • INFLUENZA VACCINE  08/01/2019   • LIPID PANEL  04/24/2020   • COLONOSCOPY  05/27/2026       Depression Screen:   PHQ-2/PHQ-9 Depression Screening 4/30/2019   Little interest or pleasure in doing things 0   Feeling down, depressed, or hopeless 0   Trouble falling or staying asleep, or sleeping too much 0   Feeling tired or having little energy 0   Poor appetite or overeating 0   Feeling bad about yourself - or that you are a failure or have let yourself or your family down 0   Trouble concentrating on things, such as reading the newspaper or watching television 0   Moving or speaking so slowly that other people could have noticed. Or the opposite - being so fidgety or restless that you have been moving around a lot more than usual 0   Thoughts that you would be better off dead, or of hurting yourself in some way 0   Total Score 0       Functional and Cognitive Screening:  Functional & Cognitive Status 4/30/2019   Do you have difficulty preparing food and eating? No   Do you have difficulty bathing yourself, getting dressed or grooming yourself? No   Do you have difficulty using the toilet? No   Do you have difficulty moving around from place to place? No   Do you have trouble with steps or getting out of a bed or a chair? No   In the past year have you fallen or experienced a near fall? No   Current Diet Well Balanced Diet   Dental Exam Up to date   Eye  "Exam Up to date   Exercise (times per week) 7 times per week   Current Exercise Activities Include Cardiovasular Workout on Exercise Equipment   Do you need help using the phone?  No   Are you deaf or do you have serious difficulty hearing?  No   Do you need help with transportation? No   Do you need help shopping? No   Do you need help preparing meals?  No   Do you need help with housework?  No   Do you need help with laundry? No   Do you need help taking your medications? No   Do you need help managing money? No   Do you ever drive or ride in a car without wearing a seat belt? No   Have you felt unusual stress, anger or loneliness in the last month? No   Who do you live with? Spouse   If you need help, do you have trouble finding someone available to you? No   Have you been bothered in the last four weeks by sexual problems? No   Do you have difficulty concentrating, remembering or making decisions? No       Does the patient have evidence of cognitive impairment? No    Identification of Risk Factors:  Risk factors include: cardiovascular risk.    Review of Systems   Constitutional: Negative.    HENT: Negative.    Eyes: Negative.    Respiratory: Negative.    Cardiovascular: Negative.    Gastrointestinal: Negative.    Endocrine: Negative.    Genitourinary: Negative.    Musculoskeletal: Positive for arthralgias.   Neurological: Negative.    Hematological: Negative.    Psychiatric/Behavioral: Negative.  Negative for agitation and confusion.       /80   Pulse 65   Temp 97.9 °F (36.6 °C) (Oral)   Resp 16   Ht 185.4 cm (73\")   Wt 94.8 kg (209 lb)   SpO2 99%   BMI 27.57 kg/m²     General Appearance:    Alert, cooperative, no distress, appears stated age   Head:    Normocephalic, without obvious abnormality, atraumatic   Eyes:    PERRL, conjunctiva/corneas clear, EOM's intact, fundi     benign, both eyes        Ears:    Normal TM's and external ear canals, both ears   Nose:   Nares normal, septum midline, " "mucosa normal, no drainage    or sinus tenderness   Throat:   Lips, mucosa, and tongue normal; teeth and gums normal   Neck:   Supple, symmetrical, trachea midline, no adenopathy;        thyroid:  No enlargement/tenderness/nodules; no carotid    bruit or JVD   Back:     Symmetric, no curvature, ROM normal, no CVA tenderness   Lungs:     Clear to auscultation bilaterally, respirations unlabored   Chest wall:    No tenderness or deformity   Heart:    Regular rate and rhythm, S1 and S2 normal, no murmur, rub    or gallop   Abdomen:     Soft, non-tender, bowel sounds active all four quadrants,     no masses, no organomegaly   Genitalia:    Deferred to urology   Rectal:    Deferred to urology   Extremities:   Extremities normal, atraumatic, no cyanosis or edema   Pulses:   2+ and symmetric all extremities   Skin:   Skin color, texture, turgor normal, no rashes or lesions   Lymph nodes:   Cervical, supraclavicular, and axillary nodes normal   Neurologic:   CNII-XII intact. Normal strength, sensation and reflexes       throughout       Objective     Vitals:    04/30/19 0801   BP: 124/80   Pulse: 65   Resp: 16   Temp: 97.9 °F (36.6 °C)   TempSrc: Oral   SpO2: 99%   Weight: 94.8 kg (209 lb)   Height: 185.4 cm (73\")       Body mass index is 27.57 kg/m².    Assessment/Plan   Patient Self-Management and Personalized Health Advice  The patient has been provided with information about: prevention of cardiac or vascular disease and preventive services including:   · Diabetes screening, see lab orders, Exercise counseling provided, Glaucoma screening recommended, Pneumococcal vaccine , Prostate cancer screening discussed.    Discussed the patient's BMI with him. The BMI is in the acceptable range.    Orders:     Diagnosis Plan   1. Encounter for Medicare annual wellness exam     2. Hyperlipidemia LDL goal <100  Comprehensive metabolic panel    Conv Lipid Panel w/ Chol/HDL Ratio   3. Immunization due  Tdap Vaccine Greater Than or " Equal To 8yo IM    Pneumococcal Conjugate Vaccine 13-Valent All (PCV13)     TDAP and Prevnar 13 today    Follow Up:      Discussed choosing low fat protein sources from plant base due to increased cholesterol on keto. Switch Pravachol to   Crestor 10mg nightly.    3 months with fasting labs    An After Visit Summary and PPPS with all of these plans were given to the patient.

## 2019-05-01 LAB
HCV AB S/CO SERPL IA: <0.1 S/CO RATIO (ref 0–0.9)
Lab: NORMAL
WRITTEN AUTHORIZATION: NORMAL

## 2019-05-05 ENCOUNTER — RESULTS ENCOUNTER (OUTPATIENT)
Dept: INTERNAL MEDICINE | Facility: CLINIC | Age: 64
End: 2019-05-05

## 2019-05-05 DIAGNOSIS — E78.5 HYPERLIPIDEMIA LDL GOAL <100: ICD-10-CM

## 2019-08-09 LAB
ALBUMIN SERPL-MCNC: 4 G/DL (ref 3.5–5.2)
ALBUMIN/GLOB SERPL: 1.9 G/DL
ALP SERPL-CCNC: 53 U/L (ref 39–117)
ALT SERPL-CCNC: 14 U/L (ref 1–41)
AST SERPL-CCNC: 18 U/L (ref 1–40)
BILIRUB SERPL-MCNC: 0.8 MG/DL (ref 0.2–1.2)
BUN SERPL-MCNC: 24 MG/DL (ref 8–23)
BUN/CREAT SERPL: 28.6 (ref 7–25)
CALCIUM SERPL-MCNC: 8.4 MG/DL (ref 8.6–10.5)
CHLORIDE SERPL-SCNC: 105 MMOL/L (ref 98–107)
CHOLEST SERPL-MCNC: 155 MG/DL (ref 0–200)
CHOLEST/HDLC SERPL: 2.58 {RATIO}
CO2 SERPL-SCNC: 27.6 MMOL/L (ref 22–29)
CREAT SERPL-MCNC: 0.84 MG/DL (ref 0.76–1.27)
GLOBULIN SER CALC-MCNC: 2.1 GM/DL
GLUCOSE SERPL-MCNC: 90 MG/DL (ref 65–99)
HDLC SERPL-MCNC: 60 MG/DL (ref 40–60)
LDLC SERPL CALC-MCNC: 86 MG/DL (ref 0–100)
POTASSIUM SERPL-SCNC: 4.6 MMOL/L (ref 3.5–5.2)
PROT SERPL-MCNC: 6.1 G/DL (ref 6–8.5)
SODIUM SERPL-SCNC: 143 MMOL/L (ref 136–145)
TRIGL SERPL-MCNC: 45 MG/DL (ref 0–150)
VLDLC SERPL CALC-MCNC: 9 MG/DL

## 2019-08-13 ENCOUNTER — OFFICE VISIT (OUTPATIENT)
Dept: INTERNAL MEDICINE | Facility: CLINIC | Age: 64
End: 2019-08-13

## 2019-08-13 VITALS
OXYGEN SATURATION: 99 % | HEART RATE: 60 BPM | RESPIRATION RATE: 16 BRPM | DIASTOLIC BLOOD PRESSURE: 80 MMHG | WEIGHT: 207 LBS | SYSTOLIC BLOOD PRESSURE: 120 MMHG | BODY MASS INDEX: 27.43 KG/M2 | TEMPERATURE: 98.1 F | HEIGHT: 73 IN

## 2019-08-13 DIAGNOSIS — E78.5 HYPERLIPIDEMIA LDL GOAL <100: Primary | ICD-10-CM

## 2019-08-13 PROCEDURE — 99213 OFFICE O/P EST LOW 20 MIN: CPT | Performed by: NURSE PRACTITIONER

## 2019-08-13 RX ORDER — ROSUVASTATIN CALCIUM 10 MG/1
10 TABLET, COATED ORAL NIGHTLY
Qty: 90 TABLET | Refills: 2 | Status: SHIPPED | OUTPATIENT
Start: 2019-08-13 | End: 2020-05-06 | Stop reason: SDUPTHER

## 2019-08-13 NOTE — PROGRESS NOTES
Chief Complaint   Patient presents with   • Follow-up   • Hyperlipidemia       Subjective     Chad Marte is a 64 y.o. male being seen for a follow up appointment today regarding  Hyperlipidemia. He was switched from Pravachol to Crestor due to poor LDL control. LDL reduced from 157 to 86. Denies myalgias .       History of Present Illness     No Known Allergies      Current Outpatient Medications:   •  meloxicam (MOBIC) 7.5 MG tablet, Take 1 tablet by mouth Daily., Disp: 30 tablet, Rfl: 5  •  rosuvastatin (CRESTOR) 10 MG tablet, Take 1 tablet by mouth Every Night., Disp: 90 tablet, Rfl: 2    The following portions of the patient's history were reviewed and updated as appropriate: allergies, current medications, past family history, past medical history, past social history, past surgical history and problem list.    Review of Systems   Constitutional: Negative.    HENT: Negative.    Eyes: Negative.    Respiratory: Negative.    Cardiovascular: Negative.    Gastrointestinal: Negative.    Endocrine: Negative.    Genitourinary: Negative.    Musculoskeletal: Negative.    Allergic/Immunologic: Negative.    Psychiatric/Behavioral: Negative.        Assessment     Physical Exam   Constitutional: He is oriented to person, place, and time. He appears well-developed and well-nourished.   Neck: Neck supple.   Cardiovascular: Normal rate, regular rhythm and normal heart sounds.   No murmur heard.  Pulmonary/Chest: Effort normal and breath sounds normal. No stridor. No respiratory distress.   Musculoskeletal: He exhibits no edema.   Neurological: He is alert and oriented to person, place, and time.   Psychiatric: He has a normal mood and affect. His behavior is normal.   Vitals reviewed.      Plan     His fasting labs were reviewed with the patient from last week.     Chad was seen today for follow-up and hyperlipidemia.    Diagnoses and all orders for this visit:    Hyperlipidemia LDL goal <100  -     rosuvastatin (CRESTOR) 10 MG  tablet; Take 1 tablet by mouth Every Night.    Discussed the difference between Pravachol and Crestor 10 mg nightly.     Follow up at [facility] in 9 months

## 2020-04-29 DIAGNOSIS — E78.5 HYPERLIPIDEMIA LDL GOAL <100: Primary | ICD-10-CM

## 2020-04-29 DIAGNOSIS — Z12.5 SCREENING FOR PROSTATE CANCER: ICD-10-CM

## 2020-04-30 LAB
ALBUMIN SERPL-MCNC: 3.9 G/DL (ref 3.5–5.2)
ALBUMIN/GLOB SERPL: 1.5 G/DL
ALP SERPL-CCNC: 61 U/L (ref 39–117)
ALT SERPL-CCNC: 24 U/L (ref 1–41)
AST SERPL-CCNC: 20 U/L (ref 1–40)
BASOPHILS # BLD AUTO: 0.02 10*3/MM3 (ref 0–0.2)
BASOPHILS NFR BLD AUTO: 0.4 % (ref 0–1.5)
BILIRUB SERPL-MCNC: 0.7 MG/DL (ref 0.2–1.2)
BUN SERPL-MCNC: 19 MG/DL (ref 8–23)
BUN/CREAT SERPL: 22.4 (ref 7–25)
CALCIUM SERPL-MCNC: 8.7 MG/DL (ref 8.6–10.5)
CHLORIDE SERPL-SCNC: 103 MMOL/L (ref 98–107)
CHOLEST SERPL-MCNC: 162 MG/DL (ref 0–200)
CO2 SERPL-SCNC: 27 MMOL/L (ref 22–29)
CREAT SERPL-MCNC: 0.85 MG/DL (ref 0.76–1.27)
EOSINOPHIL # BLD AUTO: 0.17 10*3/MM3 (ref 0–0.4)
EOSINOPHIL NFR BLD AUTO: 3.5 % (ref 0.3–6.2)
ERYTHROCYTE [DISTWIDTH] IN BLOOD BY AUTOMATED COUNT: 13.2 % (ref 12.3–15.4)
GLOBULIN SER CALC-MCNC: 2.6 GM/DL
GLUCOSE SERPL-MCNC: 90 MG/DL (ref 65–99)
HCT VFR BLD AUTO: 43.6 % (ref 37.5–51)
HDLC SERPL-MCNC: 45 MG/DL (ref 40–60)
HGB BLD-MCNC: 14.8 G/DL (ref 13–17.7)
IMM GRANULOCYTES # BLD AUTO: 0.01 10*3/MM3 (ref 0–0.05)
IMM GRANULOCYTES NFR BLD AUTO: 0.2 % (ref 0–0.5)
LDLC SERPL CALC-MCNC: 104 MG/DL (ref 0–100)
LDLC/HDLC SERPL: 2.32 {RATIO}
LYMPHOCYTES # BLD AUTO: 1.87 10*3/MM3 (ref 0.7–3.1)
LYMPHOCYTES NFR BLD AUTO: 38.4 % (ref 19.6–45.3)
MCH RBC QN AUTO: 28.8 PG (ref 26.6–33)
MCHC RBC AUTO-ENTMCNC: 33.9 G/DL (ref 31.5–35.7)
MCV RBC AUTO: 85 FL (ref 79–97)
MONOCYTES # BLD AUTO: 0.49 10*3/MM3 (ref 0.1–0.9)
MONOCYTES NFR BLD AUTO: 10.1 % (ref 5–12)
NEUTROPHILS # BLD AUTO: 2.31 10*3/MM3 (ref 1.7–7)
NEUTROPHILS NFR BLD AUTO: 47.4 % (ref 42.7–76)
NRBC BLD AUTO-RTO: 0 /100 WBC (ref 0–0.2)
PLATELET # BLD AUTO: 179 10*3/MM3 (ref 140–450)
POTASSIUM SERPL-SCNC: 4.7 MMOL/L (ref 3.5–5.2)
PROT SERPL-MCNC: 6.5 G/DL (ref 6–8.5)
PSA SERPL-MCNC: 0.56 NG/ML (ref 0–4)
RBC # BLD AUTO: 5.13 10*6/MM3 (ref 4.14–5.8)
SODIUM SERPL-SCNC: 141 MMOL/L (ref 136–145)
TRIGL SERPL-MCNC: 63 MG/DL (ref 0–150)
VLDLC SERPL CALC-MCNC: 12.6 MG/DL (ref 5–40)
WBC # BLD AUTO: 4.87 10*3/MM3 (ref 3.4–10.8)

## 2020-05-06 ENCOUNTER — OFFICE VISIT (OUTPATIENT)
Dept: INTERNAL MEDICINE | Facility: CLINIC | Age: 65
End: 2020-05-06

## 2020-05-06 VITALS
HEIGHT: 73 IN | BODY MASS INDEX: 28.36 KG/M2 | WEIGHT: 214 LBS | HEART RATE: 68 BPM | TEMPERATURE: 97 F | OXYGEN SATURATION: 97 % | SYSTOLIC BLOOD PRESSURE: 122 MMHG | DIASTOLIC BLOOD PRESSURE: 90 MMHG | RESPIRATION RATE: 16 BRPM

## 2020-05-06 DIAGNOSIS — E78.5 HYPERLIPIDEMIA LDL GOAL <100: ICD-10-CM

## 2020-05-06 DIAGNOSIS — Z23 NEED FOR PNEUMOCOCCAL VACCINATION: ICD-10-CM

## 2020-05-06 DIAGNOSIS — Z00.00 MEDICARE ANNUAL WELLNESS VISIT, SUBSEQUENT: Primary | ICD-10-CM

## 2020-05-06 DIAGNOSIS — Z12.5 SCREENING PSA (PROSTATE SPECIFIC ANTIGEN): ICD-10-CM

## 2020-05-06 DIAGNOSIS — M17.12 PRIMARY OSTEOARTHRITIS OF LEFT KNEE: ICD-10-CM

## 2020-05-06 LAB
BILIRUB BLD-MCNC: NEGATIVE MG/DL
CLARITY, POC: CLEAR
COLOR UR: YELLOW
GLUCOSE UR STRIP-MCNC: NEGATIVE MG/DL
KETONES UR QL: NEGATIVE
LEUKOCYTE EST, POC: NEGATIVE
NITRITE UR-MCNC: NEGATIVE MG/ML
PH UR: 5.5 [PH] (ref 5–8)
PROT UR STRIP-MCNC: NEGATIVE MG/DL
RBC # UR STRIP: NEGATIVE /UL
SP GR UR: 1.01 (ref 1–1.03)
UROBILINOGEN UR QL: NORMAL

## 2020-05-06 PROCEDURE — 90732 PPSV23 VACC 2 YRS+ SUBQ/IM: CPT | Performed by: NURSE PRACTITIONER

## 2020-05-06 PROCEDURE — 93000 ELECTROCARDIOGRAM COMPLETE: CPT | Performed by: NURSE PRACTITIONER

## 2020-05-06 PROCEDURE — 99213 OFFICE O/P EST LOW 20 MIN: CPT | Performed by: NURSE PRACTITIONER

## 2020-05-06 PROCEDURE — 81003 URINALYSIS AUTO W/O SCOPE: CPT | Performed by: NURSE PRACTITIONER

## 2020-05-06 PROCEDURE — 96160 PT-FOCUSED HLTH RISK ASSMT: CPT | Performed by: NURSE PRACTITIONER

## 2020-05-06 PROCEDURE — G0439 PPPS, SUBSEQ VISIT: HCPCS | Performed by: NURSE PRACTITIONER

## 2020-05-06 PROCEDURE — G0009 ADMIN PNEUMOCOCCAL VACCINE: HCPCS | Performed by: NURSE PRACTITIONER

## 2020-05-06 RX ORDER — ROSUVASTATIN CALCIUM 10 MG/1
10 TABLET, COATED ORAL NIGHTLY
Qty: 90 TABLET | Refills: 2 | Status: SHIPPED | OUTPATIENT
Start: 2020-05-06 | End: 2020-11-03 | Stop reason: SDUPTHER

## 2020-05-06 NOTE — PROGRESS NOTES
QUICK REFERENCE INFORMATION:  The ABCs of Providing the Annual Wellness Visit   CMS.[facility]    Medicare Annual Wellness Visit      Subjective   History of Present Illness    Chad Marte is a 65 y.o. male who presents for an Annual Wellness Visit. In addition, we addressed the following health issues:    He has a health history of Hyperlipidemia and OA of knees. He is currently taking Crestor for hyperlipidemia, LDL goal < 100. He is tolerating it well.     He takes Mobic 7.5 mg daily prn for OA knees/hands. He is taking this 1-2 times a week to prevent kidney issues. He also take Tylenol as needed.       PMH, PSH, SocHx, FamHx, Allergies, and Medications: Reviewed and updated in the Visit Navigator.     Outpatient Medications Prior to Visit   Medication Sig Dispense Refill   • meloxicam (MOBIC) 7.5 MG tablet Take 1 tablet by mouth Daily. 30 tablet 5   • rosuvastatin (CRESTOR) 10 MG tablet Take 1 tablet by mouth Every Night. 90 tablet 2     No facility-administered medications prior to visit.        Patient Active Problem List   Diagnosis   • Generalized osteoarthritis   • Hematuria   • Proteinuria   • H/O prostate biopsy   • Primary osteoarthritis of left knee   • Colon polyps   • Hyperlipidemia LDL goal <100   • Medicare annual wellness visit, subsequent   • Immunization due       Health Habits:  Dental Exam. up to date  Eye Exam. up to date  Exercise: 5 times/week.  Current exercise activities include: cardiovascular workout on exercise equipment, walking and yard work    Social:  See review in SnapShot activity and in SocHx section of Visit Navigator.    Health Risk Assessment:  The patient has completed a Health Risk Assessment. This has been reviewed with them and has been scanned into Media Manager as a separate document.    Current Medical Providers:  Patient Care Team:  Rita Hawley APRN as PCP - General    The [facility] providers who are involved in the care of this patient are listed  above. Additional providers and suppliers are listed below:  Dr. Amilcar Aguero    Recent Hospitalizations:  No hospitalization(s) within the last year..    Age-appropriate Screening Schedule:  Refer to the list below for future screening recommendations based on patient's age. Orders for these recommended tests are listed in the plan section. The patient has been provided with a written plan.    Health Maintenance   Topic Date Due   • ZOSTER VACCINE (2 of 3) 05/16/2014   • Pneumococcal Vaccine Once at 65 Years Old  02/26/2018   • MEDICARE ANNUAL WELLNESS  04/30/2020   • INFLUENZA VACCINE  08/01/2020   • LIPID PANEL  04/29/2021   • COLONOSCOPY  05/27/2026   • TDAP/TD VACCINES (3 - Td) 04/30/2029   • HEPATITIS C SCREENING  Completed       Depression Screen:   PHQ-2/PHQ-9 Depression Screening 5/6/2020   Little interest or pleasure in doing things 0   Feeling down, depressed, or hopeless 0   Trouble falling or staying asleep, or sleeping too much -   Feeling tired or having little energy -   Poor appetite or overeating -   Feeling bad about yourself - or that you are a failure or have let yourself or your family down -   Trouble concentrating on things, such as reading the newspaper or watching television -   Moving or speaking so slowly that other people could have noticed. Or the opposite - being so fidgety or restless that you have been moving around a lot more than usual -   Thoughts that you would be better off dead, or of hurting yourself in some way -   Total Score 0       Functional and Cognitive Screening:  Functional & Cognitive Status 5/6/2020   Do you have difficulty preparing food and eating? No   Do you have difficulty bathing yourself, getting dressed or grooming yourself? No   Do you have difficulty using the toilet? No   Do you have difficulty moving around from place to place? No   Do you have trouble with steps or getting out of a bed or a chair? No   Current Diet Well Balanced Diet   Dental Exam Up to  date   Eye Exam Up to date   Exercise (times per week) 7 times per week   Current Exercise Activities Include Walking   Do you need help using the phone?  No   Are you deaf or do you have serious difficulty hearing?  No   Do you need help with transportation? No   Do you need help shopping? No   Do you need help preparing meals?  No   Do you need help with housework?  No   Do you need help with laundry? No   Do you need help taking your medications? No   Do you need help managing money? No   Do you ever drive or ride in a car without wearing a seat belt? No   Have you felt unusual stress, anger or loneliness in the last month? No   Who do you live with? Spouse   If you need help, do you have trouble finding someone available to you? No   Have you been bothered in the last four weeks by sexual problems? No   Do you have difficulty concentrating, remembering or making decisions? No       Does the patient have evidence of cognitive impairment? No    Advanced Care Planning:  ACP discussion was held with the patient during this visit. Patient has an advance directive in EMR which is still valid.     Identification of Risk Factors:  Risk factors include: Cardiovascular risk.    Review of Systems   Constitutional: Negative.  Negative for fever.   HENT: Negative.    Eyes: Negative.    Respiratory: Negative.    Cardiovascular: Negative.  Negative for chest pain, palpitations and leg swelling.   Gastrointestinal: Negative.    Endocrine: Negative.    Genitourinary: Negative.    Musculoskeletal: Negative.    Skin: Negative.    Allergic/Immunologic: Negative.  Negative for environmental allergies, food allergies and immunocompromised state.   Neurological: Negative.    Hematological: Negative.  Negative for adenopathy. Does not bruise/bleed easily.   Psychiatric/Behavioral: Negative.    All other systems reviewed and are negative.      /90 (BP Location: Left arm, Patient Position: Sitting, Cuff Size: Large Adult)   Pulse  "68   Temp 97 °F (36.1 °C) (Temporal)   Resp 16   Ht 185.4 cm (73\")   Wt 97.1 kg (214 lb)   SpO2 97%   BMI 28.23 kg/m²     General Appearance:    Alert, cooperative, no distress, appears stated age   Head:    Normocephalic, without obvious abnormality, atraumatic   Eyes:    PERRL, conjunctiva/corneas clear, EOM's intact, fundi     benign, both eyes        Ears:    Normal TM's and external ear canals, both ears   Nose:   Nares normal, septum midline, mucosa normal, no drainage    or sinus tenderness   Throat:   Lips, mucosa, and tongue normal; teeth and gums normal   Neck:   Supple, symmetrical, trachea midline, no adenopathy;        thyroid:  No enlargement/tenderness/nodules; no carotid    bruit or JVD   Back:     Symmetric, no curvature, ROM normal, no CVA tenderness   Lungs:     Clear to auscultation bilaterally, respirations unlabored   Chest wall:    No tenderness or deformity   Heart:    Regular rate and rhythm, S1 and S2 normal, no murmur, rub    or gallop   Abdomen:     Soft, non-tender, bowel sounds active all four quadrants,     no masses, no organomegaly   Genitalia:    Deferred to urology   Rectal:    Deferred to urology   Extremities:   Extremities normal, atraumatic, no cyanosis or edema   Pulses:   2+ and symmetric all extremities   Skin:   Skin color, texture, turgor normal, no rashes or lesions   Lymph nodes:   Cervical, supraclavicular, and axillary nodes normal   Neurologic:   CNII-XII intact. Normal strength, sensation and reflexes       throughout       Objective     Vitals:    05/06/20 0805   BP: 122/90   BP Location: Left arm   Patient Position: Sitting   Cuff Size: Large Adult   Pulse: 68   Resp: 16   Temp: 97 °F (36.1 °C)   TempSrc: Temporal   SpO2: 97%   Weight: 97.1 kg (214 lb)   Height: 185.4 cm (73\")       Body mass index is 28.23 kg/m².    Assessment/Plan   Patient Self-Management and Personalized Health Advice  The patient has been provided with information about: weight " management, prevention of cardiac or vascular disease and designing advance directives and preventive services including:   · Annual Wellness Visit (AWV)  · Pneumococcal Vaccine and Administration.    Discussed the patient's BMI with him. The BMI is in the acceptable range.    Orders:  Orders Placed This Encounter   Procedures   • Comprehensive metabolic panel   • Conv Lipid Panel w/ Chol/HDL Ratio   • PSA SCREENING   • POCT urinalysis dipstick, automated   • ECG 12 Lead   • CBC & Differential      Diagnosis Plan   1. Medicare annual wellness visit, subsequent  POCT urinalysis dipstick, automated   2. Hyperlipidemia LDL goal <100  ECG 12 Lead    Comprehensive metabolic panel    Conv Lipid Panel w/ Chol/HDL Ratio    CBC & Differential   3. Primary osteoarthritis of left knee  Comprehensive metabolic panel    CBC & Differential   4. Screening PSA (prostate specific antigen)  PSA SCREENING   5. Need for pneumococcal vaccination         Follow Up:    1 year with AWV    An After Visit Summary and PPPS with all of these plans were given to the patient.

## 2020-05-06 NOTE — PROGRESS NOTES
Procedure     ECG 12 Lead  Date/Time: 5/6/2020 8:17 AM  Performed by: Rita Hawley APRN  Authorized by: Rita Hawley APRN   Comparison: compared with previous ECG from 4/28/2017  Similar to previous ECG  Rhythm: sinus rhythm  Rate: normal  BPM: 56  Conduction: conduction normal  Conduction comments: DC 0.20 QRS 0.12  ST Segments: ST segments normal  T Waves: T waves normal  QRS axis: normal    Clinical impression: normal ECG

## 2020-05-11 ENCOUNTER — RESULTS ENCOUNTER (OUTPATIENT)
Dept: INTERNAL MEDICINE | Facility: CLINIC | Age: 65
End: 2020-05-11

## 2020-05-11 DIAGNOSIS — E78.5 HYPERLIPIDEMIA LDL GOAL <100: ICD-10-CM

## 2020-05-11 DIAGNOSIS — M17.12 PRIMARY OSTEOARTHRITIS OF LEFT KNEE: ICD-10-CM

## 2020-05-11 DIAGNOSIS — Z12.5 SCREENING PSA (PROSTATE SPECIFIC ANTIGEN): ICD-10-CM

## 2020-08-05 ENCOUNTER — RESULTS ENCOUNTER (OUTPATIENT)
Dept: INTERNAL MEDICINE | Facility: CLINIC | Age: 65
End: 2020-08-05

## 2020-08-05 DIAGNOSIS — E78.5 HYPERLIPIDEMIA LDL GOAL <100: ICD-10-CM

## 2020-08-05 DIAGNOSIS — M17.12 PRIMARY OSTEOARTHRITIS OF LEFT KNEE: ICD-10-CM

## 2020-11-03 DIAGNOSIS — E78.5 HYPERLIPIDEMIA LDL GOAL <100: ICD-10-CM

## 2020-11-03 RX ORDER — ROSUVASTATIN CALCIUM 10 MG/1
10 TABLET, COATED ORAL NIGHTLY
Qty: 90 TABLET | Refills: 2 | Status: SHIPPED | OUTPATIENT
Start: 2020-11-03 | End: 2020-11-05 | Stop reason: ALTCHOICE

## 2020-11-05 NOTE — TELEPHONE ENCOUNTER
Unable to reach patient, but notified wife (ok per HIPAA) that due to insurance formulary, Rita had to change his Rosuvastatin to Atorvastatin.  She voiced understanding.  Change request faxed to [facility].

## 2020-11-07 RX ORDER — ATORVASTATIN CALCIUM 40 MG/1
40 TABLET, FILM COATED ORAL NIGHTLY
Qty: 90 TABLET | Refills: 3 | Status: SHIPPED | OUTPATIENT
Start: 2020-11-07 | End: 2021-05-10 | Stop reason: SINTOL

## 2021-01-26 ENCOUNTER — TELEPHONE (OUTPATIENT)
Dept: GASTROENTEROLOGY | Facility: CLINIC | Age: 66
End: 2021-01-26

## 2021-01-27 ENCOUNTER — PREP FOR SURGERY (OUTPATIENT)
Dept: OTHER | Facility: HOSPITAL | Age: 66
End: 2021-01-27

## 2021-01-27 DIAGNOSIS — Z86.010 PERSONAL HISTORY OF COLONIC POLYPS: ICD-10-CM

## 2021-01-27 DIAGNOSIS — Z12.11 ENCOUNTER FOR SCREENING FOR MALIGNANT NEOPLASM OF COLON: Primary | ICD-10-CM

## 2021-01-27 PROBLEM — Z86.0100 PERSONAL HISTORY OF COLONIC POLYPS: Status: ACTIVE | Noted: 2021-01-27

## 2021-01-27 NOTE — TELEPHONE ENCOUNTER
CALLED AND SPOKE WITH PATIENT.  SCHEDULED AT [place] ON 06/11/2021 AT 10:15AM - ARRIVE 9AM.  WILL MAIL INSTRUCTIONS.    COVID TEST ON 06/09/2021, WILL CALL WITH TIME.  NEED TO SELF QUARANTINE UNTIL AFTER PROCEDURE.  HE UNDERSTANDS.

## 2021-05-01 LAB
ALBUMIN SERPL-MCNC: 4 G/DL (ref 3.5–5.2)
ALBUMIN/GLOB SERPL: 1.9 G/DL
ALP SERPL-CCNC: 47 U/L (ref 39–117)
ALT SERPL-CCNC: 13 U/L (ref 1–41)
AST SERPL-CCNC: 12 U/L (ref 1–40)
BASOPHILS # BLD AUTO: 0.02 10*3/MM3 (ref 0–0.2)
BASOPHILS NFR BLD AUTO: 0.4 % (ref 0–1.5)
BILIRUB SERPL-MCNC: 0.7 MG/DL (ref 0–1.2)
BUN SERPL-MCNC: 25 MG/DL (ref 8–23)
BUN/CREAT SERPL: 32.9 (ref 7–25)
CALCIUM SERPL-MCNC: 8.8 MG/DL (ref 8.6–10.5)
CHLORIDE SERPL-SCNC: 107 MMOL/L (ref 98–107)
CHOLEST SERPL-MCNC: 153 MG/DL (ref 0–200)
CHOLEST/HDLC SERPL: 2.94 {RATIO}
CO2 SERPL-SCNC: 26.7 MMOL/L (ref 22–29)
CREAT SERPL-MCNC: 0.76 MG/DL (ref 0.76–1.27)
EOSINOPHIL # BLD AUTO: 0.15 10*3/MM3 (ref 0–0.4)
EOSINOPHIL NFR BLD AUTO: 3 % (ref 0.3–6.2)
ERYTHROCYTE [DISTWIDTH] IN BLOOD BY AUTOMATED COUNT: 13.1 % (ref 12.3–15.4)
GLOBULIN SER CALC-MCNC: 2.1 GM/DL
GLUCOSE SERPL-MCNC: 95 MG/DL (ref 65–99)
HCT VFR BLD AUTO: 44.2 % (ref 37.5–51)
HDLC SERPL-MCNC: 52 MG/DL (ref 40–60)
HGB BLD-MCNC: 14.7 G/DL (ref 13–17.7)
IMM GRANULOCYTES # BLD AUTO: 0.01 10*3/MM3 (ref 0–0.05)
IMM GRANULOCYTES NFR BLD AUTO: 0.2 % (ref 0–0.5)
LDLC SERPL CALC-MCNC: 91 MG/DL (ref 0–100)
LYMPHOCYTES # BLD AUTO: 1.51 10*3/MM3 (ref 0.7–3.1)
LYMPHOCYTES NFR BLD AUTO: 30.3 % (ref 19.6–45.3)
MCH RBC QN AUTO: 29.8 PG (ref 26.6–33)
MCHC RBC AUTO-ENTMCNC: 33.3 G/DL (ref 31.5–35.7)
MCV RBC AUTO: 89.7 FL (ref 79–97)
MONOCYTES # BLD AUTO: 0.32 10*3/MM3 (ref 0.1–0.9)
MONOCYTES NFR BLD AUTO: 6.4 % (ref 5–12)
NEUTROPHILS # BLD AUTO: 2.98 10*3/MM3 (ref 1.7–7)
NEUTROPHILS NFR BLD AUTO: 59.7 % (ref 42.7–76)
NRBC BLD AUTO-RTO: 0 /100 WBC (ref 0–0.2)
PLATELET # BLD AUTO: 197 10*3/MM3 (ref 140–450)
POTASSIUM SERPL-SCNC: 4.7 MMOL/L (ref 3.5–5.2)
PROT SERPL-MCNC: 6.1 G/DL (ref 6–8.5)
PSA SERPL-MCNC: 0.45 NG/ML (ref 0–4)
RBC # BLD AUTO: 4.93 10*6/MM3 (ref 4.14–5.8)
SODIUM SERPL-SCNC: 141 MMOL/L (ref 136–145)
TRIGL SERPL-MCNC: 48 MG/DL (ref 0–150)
VLDLC SERPL CALC-MCNC: 10 MG/DL (ref 5–40)
WBC # BLD AUTO: 4.99 10*3/MM3 (ref 3.4–10.8)

## 2021-05-10 ENCOUNTER — OFFICE VISIT (OUTPATIENT)
Dept: INTERNAL MEDICINE | Facility: CLINIC | Age: 66
End: 2021-05-10

## 2021-05-10 VITALS
HEIGHT: 73 IN | WEIGHT: 216 LBS | TEMPERATURE: 97.3 F | RESPIRATION RATE: 18 BRPM | BODY MASS INDEX: 28.63 KG/M2 | HEART RATE: 74 BPM | OXYGEN SATURATION: 94 % | DIASTOLIC BLOOD PRESSURE: 80 MMHG | SYSTOLIC BLOOD PRESSURE: 128 MMHG

## 2021-05-10 DIAGNOSIS — M65.342 TRIGGER RING FINGER OF LEFT HAND: ICD-10-CM

## 2021-05-10 DIAGNOSIS — Z00.00 ENCOUNTER FOR ANNUAL WELLNESS VISIT (AWV) IN MEDICARE PATIENT: Primary | ICD-10-CM

## 2021-05-10 DIAGNOSIS — Z12.5 SCREENING FOR PROSTATE CANCER: ICD-10-CM

## 2021-05-10 DIAGNOSIS — E78.5 HYPERLIPIDEMIA LDL GOAL <100: ICD-10-CM

## 2021-05-10 PROBLEM — K63.5 COLON POLYPS: Status: RESOLVED | Noted: 2017-04-28 | Resolved: 2021-05-10

## 2021-05-10 PROCEDURE — 96160 PT-FOCUSED HLTH RISK ASSMT: CPT | Performed by: NURSE PRACTITIONER

## 2021-05-10 PROCEDURE — 99213 OFFICE O/P EST LOW 20 MIN: CPT | Performed by: NURSE PRACTITIONER

## 2021-05-10 PROCEDURE — 1126F AMNT PAIN NOTED NONE PRSNT: CPT | Performed by: NURSE PRACTITIONER

## 2021-05-10 PROCEDURE — G0439 PPPS, SUBSEQ VISIT: HCPCS | Performed by: NURSE PRACTITIONER

## 2021-05-10 PROCEDURE — 1170F FXNL STATUS ASSESSED: CPT | Performed by: NURSE PRACTITIONER

## 2021-05-10 PROCEDURE — 1159F MED LIST DOCD IN RCRD: CPT | Performed by: NURSE PRACTITIONER

## 2021-05-10 RX ORDER — ROSUVASTATIN CALCIUM 10 MG/1
10 TABLET, COATED ORAL NIGHTLY
Qty: 90 TABLET | Refills: 1 | Status: SHIPPED | OUTPATIENT
Start: 2021-05-10 | End: 2022-01-26

## 2021-05-10 NOTE — PROGRESS NOTES
"The ABCs of the Annual Wellness Visit  Subsequent Medicare Wellness Visit    Chief Complaint   Patient presents with   • Hyperlipidemia LDL goal <100       Subjective   History of Present Illness:  Chad Marte is a 66 y.o. male who presents for a Subsequent Medicare Wellness Visit.    He has hyperlipidemia. Ever since changing to Lipitor from Crestor, he has muscle aches.     He has a new complaint of trigger finger of his ring finger of left hand. He noticed it \"catching\" a few months ago. He thought it was due to his wedding ring, so he stopped wearing it for a while, but it did not change.     HEALTH RISK ASSESSMENT    Recent Hospitalizations:  No hospitalization(s) within the last year.    Current Medical Providers:  Patient Care Team:  Rita Hawley APRN as PCP - Zuhair Young MD as Consulting Physician (Urology)  Clayton Keene MD as Surgeon (Orthopedic Surgery)    Smoking Status:  Social History     Tobacco Use   Smoking Status Never Smoker   Smokeless Tobacco Never Used       Alcohol Consumption:  Social History     Substance and Sexual Activity   Alcohol Use None       Depression Screen:   PHQ-2/PHQ-9 Depression Screening 5/10/2021   Little interest or pleasure in doing things 0   Feeling down, depressed, or hopeless 0   Trouble falling or staying asleep, or sleeping too much -   Feeling tired or having little energy -   Poor appetite or overeating -   Feeling bad about yourself - or that you are a failure or have let yourself or your family down -   Trouble concentrating on things, such as reading the newspaper or watching television -   Moving or speaking so slowly that other people could have noticed. Or the opposite - being so fidgety or restless that you have been moving around a lot more than usual -   Thoughts that you would be better off dead, or of hurting yourself in some way -   Total Score 0       Fall Risk Screen:  STEADI Fall Risk Assessment was completed, and patient is at LOW " risk for falls.Assessment completed on:5/10/2021    Health Habits and Functional and Cognitive Screening:  Functional & Cognitive Status 5/10/2021   Do you have difficulty preparing food and eating? No   Do you have difficulty bathing yourself, getting dressed or grooming yourself? No   Do you have difficulty using the toilet? No   Do you have difficulty moving around from place to place? No   Do you have trouble with steps or getting out of a bed or a chair? No   Current Diet Well Balanced Diet   Dental Exam Up to date        Dental Exam Comment -   Eye Exam Up to date        Eye Exam Comment -   Exercise (times per week) 3 times per week   Current Exercises Include (No Data)        Exercise Comment gym   Current Exercise Activities Include -   Do you need help using the phone?  No   Are you deaf or do you have serious difficulty hearing?  No   Do you need help with transportation? No   Do you need help shopping? No   Do you need help preparing meals?  No   Do you need help with housework?  No   Do you need help with laundry? No   Do you need help taking your medications? No   Do you need help managing money? No   Do you ever drive or ride in a car without wearing a seat belt? No   Have you felt unusual stress, anger or loneliness in the last month? No   Who do you live with? Spouse   If you need help, do you have trouble finding someone available to you? No   Have you been bothered in the last four weeks by sexual problems? No   Do you have difficulty concentrating, remembering or making decisions? No         Does the patient have evidence of cognitive impairment? No    Asprin use counseling:Does not need ASA (and currently is not on it)    Age-appropriate Screening Schedule:  Refer to the list below for future screening recommendations based on patient's age, sex and/or medical conditions. Orders for these recommended tests are listed in the plan section. The patient has been provided with a written  plan.    Health Maintenance   Topic Date Due   • ZOSTER VACCINE (2 of 3) 05/15/2021 (Originally 5/16/2014)   • INFLUENZA VACCINE  08/01/2021   • LIPID PANEL  04/30/2022   • TDAP/TD VACCINES (3 - Td) 04/30/2029          The following portions of the patient's history were reviewed and updated as appropriate: allergies, current medications, past family history, past medical history, past social history, past surgical history and problem list.    Outpatient Medications Prior to Visit   Medication Sig Dispense Refill   • meloxicam (MOBIC) 7.5 MG tablet Take 1 tablet by mouth Daily. 30 tablet 5   • atorvastatin (Lipitor) 40 MG tablet Take 1 tablet by mouth Every Night. 90 tablet 3     No facility-administered medications prior to visit.       Patient Active Problem List   Diagnosis   • Generalized osteoarthritis   • Hematuria   • Proteinuria   • H/O prostate biopsy   • Primary osteoarthritis of left knee   • Hyperlipidemia LDL goal <100   • Immunization due   • Encounter for annual wellness visit (AWV) in Medicare patient   • Personal history of colonic polyps       Advanced Care Planning:  ACP discussion was held with the patient during this visit. Patient does not have an advance directive, information provided.    Review of Systems   Constitutional: Negative.    HENT: Negative.    Eyes: Negative.    Respiratory: Negative.    Cardiovascular: Negative for chest pain, palpitations and leg swelling.   Gastrointestinal: Negative.    Endocrine: Negative.    Musculoskeletal: Positive for arthralgias and myalgias (from lipitor).   Allergic/Immunologic: Negative.    Neurological: Negative.    Hematological: Negative.    Psychiatric/Behavioral: Negative.        Compared to one year ago, the patient feels his physical health is the same.  Compared to one year ago, the patient feels his mental health is the same.    Reviewed chart for potential of high risk medication in the elderly: yes  Reviewed chart for potential of harmful  "drug interactions in the elderly:yes    Objective         Vitals:    05/10/21 1054   BP: 128/80   BP Location: Left arm   Patient Position: Sitting   Cuff Size: Large Adult   Pulse: 74   Resp: 18   Temp: 97.3 °F (36.3 °C)   TempSrc: Temporal   SpO2: 94%   Weight: 98 kg (216 lb)   Height: 185.4 cm (73\")   PainSc: 0-No pain       Body mass index is 28.5 kg/m².  Discussed the patient's BMI with him. The BMI is in the acceptable range.    Physical Exam  Vitals reviewed.   Constitutional:       Appearance: Normal appearance. He is not ill-appearing.   HENT:      Head: Normocephalic.      Right Ear: Tympanic membrane normal.      Left Ear: Tympanic membrane normal.   Cardiovascular:      Rate and Rhythm: Normal rate and regular rhythm.      Pulses: Normal pulses.      Heart sounds: Normal heart sounds. No murmur heard.     Pulmonary:      Effort: Pulmonary effort is normal.      Breath sounds: Normal breath sounds.   Musculoskeletal:         General: No swelling.      Left hand: Deformity (trigger finger, ring finger) present. Normal range of motion. Normal strength. Normal capillary refill. Normal pulse.      Cervical back: Neck supple.   Skin:     General: Skin is warm and dry.   Neurological:      Mental Status: He is alert and oriented to person, place, and time.   Psychiatric:         Mood and Affect: Mood normal.         Behavior: Behavior normal.         Thought Content: Thought content normal.         Lab Results   Component Value Date    GLU 95 04/30/2021    CHLPL 153 04/30/2021    TRIG 48 04/30/2021    HDL 52 04/30/2021    LDL 91 04/30/2021    VLDL 10 04/30/2021        Assessment/Plan   Medicare Risks and Personalized Health Plan  CMS Preventative Services Quick Reference  Advance Directive Discussion  Cardiovascular risk  Colon Cancer Screening  Diabetic Lab Screening   Immunizations Discussed/Encouraged (specific immunizations; COVID19 )    The above risks/problems have been discussed with the " patient.  Pertinent information has been shared with the patient in the After Visit Summary.  Follow up plans and orders are seen below in the Assessment/Plan Section.     Diagnosis Plan   1. Encounter for annual wellness visit (AWV) in Medicare patient     2. Hyperlipidemia LDL goal <100  rosuvastatin (Crestor) 10 MG tablet    Comprehensive Metabolic Panel    Lipid Panel With / Chol / HDL Ratio    CBC & Differential    Stop Lipitor due to muscle aches. Resume Crestor 10mg nightly   3. Trigger ring finger of left hand  Ambulatory Referral to Hand Surgery   4. Screening for prostate cancer  PSA Screen     Follow Up:  Return in about 1 year (around 5/10/2022) for Annual Wellness Visit, Medicare, Fasting labs prior to appt.     An After Visit Summary and PPPS were given to the patient.

## 2021-05-10 NOTE — PATIENT INSTRUCTIONS
Advance Care Planning and Advance Directives     You make decisions on a daily basis - decisions about where you want to live, your career, your home, your life. Perhaps one of the most important decisions you face is your choice for future medical care. Take time to talk with your family and your healthcare team and start planning today.  Advance Care Planning is a process that can help you:  · Understand possible future healthcare decisions in light of your own experiences  · Reflect on those decision in light of your goals and values  · Discuss your decisions with those closest to you and the healthcare professionals that care for you  · Make a plan by creating a document that reflects your wishes    Surrogate Decision Maker  In the event of a medical emergency, which has left you unable to communicate or to make your own decisions, you would need someone to make decisions for you.  It is important to discuss your preferences for medical treatment with this person while you are in good health.     Qualities of a surrogate decision maker:  • Willing to take on this role and responsibility  • Knows what you want for future medical care  • Willing to follow your wishes even if they don't agree with them  • Able to make difficult medical decisions under stressful circumstances    Advance Directives  These are legal documents you can create that will guide your healthcare team and decision maker(s) when needed. These documents can be stored in the electronic medical record.    · Living Will - a legal document to guide your care if you have a terminal condition or a serious illness and are unable to communicate. States vary by statute in document names/types, but most forms may include one or more of the following:        -  Directions regarding life-prolonging treatments        -  Directions regarding artificially provided nutrition/hydration        -  Choosing a healthcare decision maker        -  Direction  regarding organ/tissue donation    · Durable Power of  for Healthcare - this document names an -in-fact to make medical decisions for you, but it may also allow this person to make personal and financial decisions for you. Please seek the advice of an  if you need this type of document.    **Advance Directives are not required and no one may discriminate against you if you do not sign one.    Medical Orders  Many states allow specific forms/orders signed by your physician to record your wishes for medical treatment in your current state of health. This form, signed in personal communication with your physician, addresses resuscitation and other medical interventions that you may or may not want.      For more information or to schedule a time with a [facility] Advance Care Planning Facilitator contact: [email or web address] or call [phone] and someone will contact you directly.

## 2021-05-18 ENCOUNTER — TELEPHONE (OUTPATIENT)
Dept: INTERNAL MEDICINE | Facility: CLINIC | Age: 66
End: 2021-05-18

## 2021-05-18 NOTE — TELEPHONE ENCOUNTER
PATIENT CALLED AND STATES HE HAS RECEIVED EMAILS FOR PRE AUTH OF   rosuvastatin (Crestor) 10 MG tablet    PLEASE CALL [facility] FOR PRE AUTH.  PRESCRIPTION # [id number]    CALL BACK NUMBER [phone]

## 2021-06-04 ENCOUNTER — OFFICE VISIT (OUTPATIENT)
Dept: ORTHOPEDIC SURGERY | Facility: CLINIC | Age: 66
End: 2021-06-04

## 2021-06-04 VITALS
DIASTOLIC BLOOD PRESSURE: 87 MMHG | BODY MASS INDEX: 27.83 KG/M2 | SYSTOLIC BLOOD PRESSURE: 151 MMHG | HEART RATE: 56 BPM | WEIGHT: 210 LBS | HEIGHT: 73 IN

## 2021-06-04 DIAGNOSIS — M17.12 PRIMARY OSTEOARTHRITIS OF LEFT KNEE: Primary | ICD-10-CM

## 2021-06-04 PROCEDURE — 73562 X-RAY EXAM OF KNEE 3: CPT | Performed by: ORTHOPAEDIC SURGERY

## 2021-06-04 PROCEDURE — 99211 OFF/OP EST MAY X REQ PHY/QHP: CPT | Performed by: ORTHOPAEDIC SURGERY

## 2021-06-04 NOTE — PROGRESS NOTES
Subjective: Osteoarthritis left knee     Patient ID: Chad Marte is a 66 y.o. male.    Chief Complaint:    History of Present Illness 66-year-old male known to me returns for consideration of repeat gel injection into his left knee.  Had Orthovisc injections 4 years ago did well to just recently.  Is taking anti-inflammatories on an as-needed basis.       Social History     Occupational History   • Not on file   Tobacco Use   • Smoking status: Never Smoker   • Smokeless tobacco: Never Used   Vaping Use   • Vaping Use: Never used   Substance and Sexual Activity   • Alcohol use: Not on file   • Drug use: No   • Sexual activity: Defer      Review of Systems   Constitutional: Negative for chills, diaphoresis, fever and unexpected weight change.   HENT: Negative for hearing loss, nosebleeds, sore throat and tinnitus.    Eyes: Negative for pain and visual disturbance.   Respiratory: Negative for cough, shortness of breath and wheezing.    Cardiovascular: Negative for chest pain and palpitations.   Gastrointestinal: Negative for abdominal pain, diarrhea, nausea and vomiting.   Endocrine: Negative for cold intolerance, heat intolerance and polydipsia.   Genitourinary: Negative for difficulty urinating, dysuria and hematuria.   Musculoskeletal: Positive for arthralgias and myalgias. Negative for joint swelling.   Skin: Negative for rash and wound.   Allergic/Immunologic: Negative for environmental allergies.   Neurological: Negative for dizziness, syncope and numbness.   Hematological: Does not bruise/bleed easily.   Psychiatric/Behavioral: Negative for dysphoric mood and sleep disturbance. The patient is not nervous/anxious.    Answers for HPI/ROS submitted by the patient on 6/2/2021  What is the primary reason for your visit?: Other  Please describe your symptoms.: Left knee  Have you had these symptoms before?: Yes  How long have you been having these symptoms?: Greater than 2 weeks  Please list any medications you are  currently taking for this condition.: Moloxin 7.5 mg  Please describe any probable cause for these symptoms. : Cushioning worn out          Past Medical History:   Diagnosis Date   • Arthritis    • Hypercholesteremia    • Rotator cuff syndrome    • Tear of meniscus of knee    • Welcome to Medicare preventive visit 3/28/2016     Past Surgical History:   Procedure Laterality Date   • COLONOSCOPY N/A 5/27/2016    Procedure: COLONOSCOPY / polypectomy;  Surgeon: Heath Naik MD;  Location: [facility];  Service:    • FRACTURE SURGERY Right     R arm- plate   • HERNIA REPAIR     • TENDON TRANSFER Right     hand   • TONSILLECTOMY       Family History   Problem Relation Age of Onset   • Cancer Father          Objective:  Vitals:    06/04/21 0848   BP: 151/87   Pulse: 56         06/04/21  0848   Weight: 95.3 kg (210 lb)     Body mass index is 27.71 kg/m².        Ortho Exam   AP lateral sunrise view of the knee does show some progression of the arthritis compared to x-rays done 4 years ago.  There is increasing spur formation in the medial patellofemoral compartment.  But is not bone-on-bone.  He is alert and oriented x3.  The knee has no swelling effusion erythema and there is pain and crepitus with range of motion but there is no instability.  Calf is nontender.  No motor or sensory deficit.  Tolerating the anti-inflammatory    Assessment:        1. Primary osteoarthritis of left knee           Plan: We will contact insurance company regarding repeat viscous injection into the left knee.  Discussed with him they were now using the Monovisc injection.  He was in agreement.  Answered all questions.            Work Status:    SANDHYA query complete.    Orders:  Orders Placed This Encounter   Procedures   • XR Knee 3 View Left   • Visco Treatment       Medications:  No orders of the defined types were placed in this encounter.      Followup:  Return in about 2 weeks (around 6/18/2021).          Dictated utilizing  Carlie dictation

## 2021-06-08 ENCOUNTER — TELEPHONE (OUTPATIENT)
Dept: ORTHOPEDIC SURGERY | Facility: CLINIC | Age: 66
End: 2021-06-08

## 2021-06-08 NOTE — TELEPHONE ENCOUNTER
Provider: DR HERNANDEZ  Caller: RADHAMES ROBERTS  Relationship to Patient: SELF  Phone Number: [phone]  Reason for Call: CHECKING STATUS OF VISCO APPROVAL  When was the patient last seen: 06/04/21

## 2021-06-09 PROBLEM — Z12.11 ENCOUNTER FOR SCREENING FOR MALIGNANT NEOPLASM OF COLON: Status: ACTIVE | Noted: 2021-01-27

## 2021-06-10 ENCOUNTER — ANESTHESIA EVENT (OUTPATIENT)
Dept: PERIOP | Facility: HOSPITAL | Age: 66
End: 2021-06-10

## 2021-06-11 ENCOUNTER — ANESTHESIA (OUTPATIENT)
Dept: PERIOP | Facility: HOSPITAL | Age: 66
End: 2021-06-11

## 2021-06-11 ENCOUNTER — HOSPITAL ENCOUNTER (OUTPATIENT)
Facility: HOSPITAL | Age: 66
Setting detail: HOSPITAL OUTPATIENT SURGERY
Discharge: HOME | End: 2021-06-11
Attending: INTERNAL MEDICINE | Admitting: INTERNAL MEDICINE

## 2021-06-11 VITALS
HEIGHT: 73 IN | DIASTOLIC BLOOD PRESSURE: 86 MMHG | HEART RATE: 52 BPM | BODY MASS INDEX: 28.68 KG/M2 | RESPIRATION RATE: 12 BRPM | OXYGEN SATURATION: 100 % | WEIGHT: 216.4 LBS | TEMPERATURE: 98.6 F | SYSTOLIC BLOOD PRESSURE: 151 MMHG

## 2021-06-11 DIAGNOSIS — Z12.11 ENCOUNTER FOR SCREENING FOR MALIGNANT NEOPLASM OF COLON: ICD-10-CM

## 2021-06-11 DIAGNOSIS — Z86.010 PERSONAL HISTORY OF COLONIC POLYPS: ICD-10-CM

## 2021-06-11 PROCEDURE — 88305 TISSUE EXAM BY PATHOLOGIST: CPT | Performed by: INTERNAL MEDICINE

## 2021-06-11 PROCEDURE — 45380 COLONOSCOPY AND BIOPSY: CPT | Performed by: INTERNAL MEDICINE

## 2021-06-11 PROCEDURE — 25010000002 PROPOFOL 10 MG/ML EMULSION: Performed by: NURSE ANESTHETIST, CERTIFIED REGISTERED

## 2021-06-11 RX ORDER — MAGNESIUM HYDROXIDE 1200 MG/15ML
LIQUID ORAL AS NEEDED
Status: DISCONTINUED | OUTPATIENT
Start: 2021-06-11 | End: 2021-06-11 | Stop reason: HOSPADM

## 2021-06-11 RX ORDER — SODIUM CHLORIDE 9 MG/ML
40 INJECTION, SOLUTION INTRAVENOUS AS NEEDED
Status: DISCONTINUED | OUTPATIENT
Start: 2021-06-11 | End: 2021-06-11 | Stop reason: HOSPADM

## 2021-06-11 RX ORDER — SODIUM CHLORIDE, SODIUM LACTATE, POTASSIUM CHLORIDE, CALCIUM CHLORIDE 600; 310; 30; 20 MG/100ML; MG/100ML; MG/100ML; MG/100ML
9 INJECTION, SOLUTION INTRAVENOUS CONTINUOUS
Status: DISCONTINUED | OUTPATIENT
Start: 2021-06-11 | End: 2021-06-11 | Stop reason: HOSPADM

## 2021-06-11 RX ORDER — LIDOCAINE HYDROCHLORIDE 20 MG/ML
INJECTION, SOLUTION INFILTRATION; PERINEURAL AS NEEDED
Status: DISCONTINUED | OUTPATIENT
Start: 2021-06-11 | End: 2021-06-11 | Stop reason: SURG

## 2021-06-11 RX ORDER — SODIUM CHLORIDE 0.9 % (FLUSH) 0.9 %
10 SYRINGE (ML) INJECTION AS NEEDED
Status: DISCONTINUED | OUTPATIENT
Start: 2021-06-11 | End: 2021-06-11 | Stop reason: HOSPADM

## 2021-06-11 RX ORDER — PROPOFOL 10 MG/ML
VIAL (ML) INTRAVENOUS AS NEEDED
Status: DISCONTINUED | OUTPATIENT
Start: 2021-06-11 | End: 2021-06-11 | Stop reason: SURG

## 2021-06-11 RX ORDER — LIDOCAINE HYDROCHLORIDE 10 MG/ML
0.5 INJECTION, SOLUTION EPIDURAL; INFILTRATION; INTRACAUDAL; PERINEURAL ONCE AS NEEDED
Status: DISCONTINUED | OUTPATIENT
Start: 2021-06-11 | End: 2021-06-11 | Stop reason: HOSPADM

## 2021-06-11 RX ORDER — SODIUM CHLORIDE 0.9 % (FLUSH) 0.9 %
10 SYRINGE (ML) INJECTION EVERY 12 HOURS SCHEDULED
Status: DISCONTINUED | OUTPATIENT
Start: 2021-06-11 | End: 2021-06-11 | Stop reason: HOSPADM

## 2021-06-11 RX ORDER — GLYCOPYRROLATE 0.2 MG/ML
INJECTION INTRAMUSCULAR; INTRAVENOUS AS NEEDED
Status: DISCONTINUED | OUTPATIENT
Start: 2021-06-11 | End: 2021-06-11 | Stop reason: SURG

## 2021-06-11 RX ADMIN — PROPOFOL 50 MG: 10 INJECTION, EMULSION INTRAVENOUS at 10:20

## 2021-06-11 RX ADMIN — LIDOCAINE HYDROCHLORIDE 100 MG: 20 INJECTION, SOLUTION INFILTRATION; PERINEURAL at 09:51

## 2021-06-11 RX ADMIN — SODIUM CHLORIDE, POTASSIUM CHLORIDE, SODIUM LACTATE AND CALCIUM CHLORIDE 9 ML/HR: 600; 310; 30; 20 INJECTION, SOLUTION INTRAVENOUS at 09:07

## 2021-06-11 RX ADMIN — PROPOFOL 50 MG: 10 INJECTION, EMULSION INTRAVENOUS at 09:59

## 2021-06-11 RX ADMIN — PROPOFOL 50 MG: 10 INJECTION, EMULSION INTRAVENOUS at 10:24

## 2021-06-11 RX ADMIN — GLYCOPYRROLATE 0.2 MG: 0.2 INJECTION INTRAMUSCULAR; INTRAVENOUS at 09:49

## 2021-06-11 RX ADMIN — PROPOFOL 50 MG: 10 INJECTION, EMULSION INTRAVENOUS at 09:51

## 2021-06-11 RX ADMIN — PROPOFOL 50 MG: 10 INJECTION, EMULSION INTRAVENOUS at 10:06

## 2021-06-11 RX ADMIN — PROPOFOL 50 MG: 10 INJECTION, EMULSION INTRAVENOUS at 10:16

## 2021-06-11 RX ADMIN — PROPOFOL 50 MG: 10 INJECTION, EMULSION INTRAVENOUS at 10:03

## 2021-06-11 RX ADMIN — PROPOFOL 50 MG: 10 INJECTION, EMULSION INTRAVENOUS at 09:55

## 2021-06-11 RX ADMIN — PROPOFOL 50 MG: 10 INJECTION, EMULSION INTRAVENOUS at 10:11

## 2021-06-11 NOTE — OP NOTE
COLONOSCOPY WITH POLYPECTOMY  Procedure Report    Patient Name:  Chad Marte  YOB: 1955    Date of Surgery:  6/11/2021     Indications:  Encounter for screening for malignant neoplasm of colon [Z12.11]  Personal history of colonic polyps [Z86.010]      Pre-op Diagnosis:   Encounter for screening for malignant neoplasm of colon [Z12.11]  Personal history of colonic polyps [Z86.010]    Post-Op Diagnosis Codes:     * Encounter for screening for malignant neoplasm of colon [Z12.11]     * Personal history of colonic polyps [Z86.010]     * Colon polyp [K63.5]         Procedure/CPT® Codes:      Procedure(s):  COLONOSCOPY WITH POLYPECTOMY    Staff:  Surgeon(s):  Heath Naik MD         Anesthesia: Monitored Anesthesia Care    Estimated Blood Loss: none    Specimens:   ID Type Source Tests Collected by Time   A (Not marked as sent) : cecal polyp Polyp Large Intestine, Cecum TISSUE PATHOLOGY EXAM Heath Naik MD 6/11/2021 1007   B (Not marked as sent) : ascending colon polyps x2 Polyp Large Intestine, Right / Ascending Colon TISSUE PATHOLOGY EXAM Heath Naik MD 6/11/2021 1012   C (Not marked as sent) : rectal polyp Polyp Large Intestine, Rectum TISSUE PATHOLOGY EXAM Heath Naik MD 6/11/2021 1026       Implants:    Nothing was implanted during the procedure      Description of Procedure: After having signed informed consent, he was brought to the endoscopy suite, placed in left lateral decubitus position and given his IV sedation. A rectal exam revealed no external lesions. Normal anal tone. Normal palpable prostate. The scope was introduced in the rectum and advanced under direct visualization with ease through the rectum, sigmoid colon, descending colon, to and around the splenic flexure, reduced and advanced through the transverse colon with some looping. The scope was reduced and using abdominal pressure the scope was advanced to and around the  hepatic flexure, reduced in the ascending colon and then advanced more easily through the ascending colon and to the cecum. The cecum was identified by appendiceal orifice and ileocecal valve. The ileocecal valve was briefly intubated. The terminal ileum was normal appearing. The scope was withdrawn back in the ascending colon and advanced in the cecum. There was a diminutive polyp noted in the cecum under the First fold there was 4-5 mm in size. It was biopsied, felt to be completely removed and sent separately as a cecal polyp. The scope was then withdrawn passed the valve into the ascending colon. Examined the colon in circumferential fashion. In the distal ascending colon there were 2 sessile 4 mm polyps that were both biopsied and felt to be completely removed and sent separately as ascending colon polyps x2. The scope was then withdrawn around the hepatic flexure, through the transverse colon, which was normal appearing through a normal appearing descending and sigmoid colon as well. No diverticular openings were encountered. Within the rectum there is a single round 4 mm polyp that was biopsied and felt to be completely removed and sent separately a rectal polyp. The scope was retroflexed revealing intact dentate line. No mucosal lesions. The scope was deretroflexed and withdrawn. The patient tolerated the procedure very well.          Findings: Colon to TI good Prep  [id number]       Complications: None    Recommendations: Results to be called.      Heath Naik MD     Date: 6/11/2021  Time: 10:33 EDT

## 2021-06-11 NOTE — BRIEF OP NOTE
COLONOSCOPY WITH POLYPECTOMY  Progress Note    Chad Marte  6/11/2021    Pre-op Diagnosis:   Encounter for screening for malignant neoplasm of colon [Z12.11]  Personal history of colonic polyps [Z86.010]       Post-Op Diagnosis Codes:     * Encounter for screening for malignant neoplasm of colon [Z12.11]     * Personal history of colonic polyps [Z86.010]     * Colon polyp [K63.5]    Procedure/CPT® Codes:        Procedure(s):  COLONOSCOPY WITH POLYPECTOMY    Surgeon(s):  Heath Naik MD    Anesthesia: Monitored Anesthesia Care    Staff:   Circulator: Nubia Renee RN  Scrub Person: Lori Ivey         Estimated Blood Loss: none    Urine Voided: * No values recorded between 6/11/2021  9:49 AM and 6/11/2021 10:27 AM *    Specimens:                Specimens     ID Source Type Tests Collected By Collected At Frozen?    A Large Intestine, Cecum Polyp · TISSUE PATHOLOGY EXAM   Heath Naik MD 6/11/21 1007     Description: cecal polyp    This specimen was not marked as sent.    B Large Intestine, Right / Ascending Colon Polyp · TISSUE PATHOLOGY EXAM   Heath Naik MD 6/11/21 1012     Description: ascending colon polyps x2    This specimen was not marked as sent.    C Large Intestine, Rectum Polyp · TISSUE PATHOLOGY EXAM   Heath Naik MD 6/11/21 1026     Description: rectal polyp    This specimen was not marked as sent.                Drains: * No LDAs found *    Findings: Colon to TI good Prep  [id number]    Complications: None          Heath Naik MD     Date: 6/11/2021  Time: 10:31 EDT

## 2021-06-11 NOTE — ANESTHESIA POSTPROCEDURE EVALUATION
Patient: Chad Marte    Procedure Summary     Date: 06/11/21 Room / Location: [facility] ENDOSCOPY 1 /  [facility]    Anesthesia Start: 0949 Anesthesia Stop: 1030    Procedure: COLONOSCOPY WITH POLYPECTOMY (N/A ) Diagnosis:       Encounter for screening for malignant neoplasm of colon      Personal history of colonic polyps      Colon polyp      (Encounter for screening for malignant neoplasm of colon [Z12.11])      (Personal history of colonic polyps [Z86.010])    Surgeons: Heath Naik MD Provider: Reynaldo Funez CRNA    Anesthesia Type: MAC ASA Status: 2          Anesthesia Type: MAC    Vitals  Vitals Value Taken Time   /86 06/11/21 1100   Temp 98.6 °F (37 °C) 06/11/21 1035   Pulse 52 06/11/21 1100   Resp 12 06/11/21 1100   SpO2 100 % 06/11/21 1100           Post Anesthesia Care and Evaluation    Patient location during evaluation: bedside  Patient participation: complete - patient participated  Level of consciousness: awake and alert  Pain score: 2  Pain management: adequate  Airway patency: patent  Anesthetic complications: No anesthetic complications  PONV Status: none  Cardiovascular status: acceptable  Respiratory status: acceptable  Hydration status: acceptable

## 2021-06-11 NOTE — H&P
"Patient Care Team:  Rita Hawley APRN as PCP - General  Zuhair Lopez MD as Consulting Physician (Urology)  Clayton Keene MD as Surgeon (Orthopedic Surgery)    CHIEF COMPLAINT: Personal hx colon polyps    HISTORY OF PRESENT ILLNESS:  Last exam was 2016    Past Medical History:   Diagnosis Date   • Arthritis    • Hypercholesteremia    • Rotator cuff syndrome    • Tear of meniscus of knee    • Welcome to Medicare preventive visit 3/28/2016     Past Surgical History:   Procedure Laterality Date   • COLONOSCOPY N/A 5/27/2016    Procedure: COLONOSCOPY / polypectomy;  Surgeon: Heath Naik MD;  Location: [facility];  Service:    • FRACTURE SURGERY Right     R arm- plate   • HERNIA REPAIR     • TENDON TRANSFER Right     hand   • TONSILLECTOMY       Family History   Problem Relation Age of Onset   • Cancer Father      Social History     Tobacco Use   • Smoking status: Never Smoker   • Smokeless tobacco: Never Used   Vaping Use   • Vaping Use: Never used   Substance Use Topics   • Alcohol use: Yes     Alcohol/week: 3.0 standard drinks     Types: 3 Glasses of wine per week   • Drug use: No     Medications Prior to Admission   Medication Sig Dispense Refill Last Dose   • meloxicam (MOBIC) 7.5 MG tablet Take 1 tablet by mouth Daily. 30 tablet 5 6/4/2021   • rosuvastatin (Crestor) 10 MG tablet Take 1 tablet by mouth Every Night. 90 tablet 1 6/9/2021 at 2200     Allergies:  Lipitor [atorvastatin]    REVIEW OF SYSTEMS:  Please see the above history of present illness for pertinent positives and negatives.  The remainder of the patient's systems have been reviewed and are negative.     Vital Signs  Temp:  [97.7 °F (36.5 °C)] 97.7 °F (36.5 °C)  Heart Rate:  [60] 60  Resp:  [16] 16  BP: (135)/(80) 135/80    Flowsheet Rows      First Filed Value   Admission Height  185.4 cm (73\") Documented at 06/11/2021 0844   Admission Weight  98.2 kg (216 lb 6.4 oz) Documented at 06/11/2021 0844           Physical " Exam:  Physical Exam   Constitutional: Patient appears well-developed and well-nourished and in no acute distress   HEENT:   Head: Normocephalic and atraumatic.   Eyes:  Pupils are equal, round, and reactive to light. EOM are intact. Sclerae are anicteric and non-injected.  Mouth and Throat: Patient has moist mucous membranes. Oropharynx is clear of any erythema or exudate.     Neck: Neck supple. No JVD present. No thyromegaly present. No lymphadenopathy present.  Cardiovascular: Regular rate, regular rhythm, S1 normal and S2 normal.  Exam reveals no gallop and no friction rub.  No murmur heard.  Pulmonary/Chest: Lungs are clear to auscultation bilaterally. No respiratory distress. No wheezes. No rhonchi. No rales.   Abdominal: Soft. Bowel sounds are normal. No distension and no mass. There is no hepatosplenomegaly. There is no tenderness.   Musculoskeletal: Normal Muscle tone  Extremities: No edema. Pulses are palpable in all 4 extremities.  Neurological: Patient is alert and oriented to person, place, and time. Cranial nerves II-XII are grossly intact with no focal deficits.  Skin: Skin is warm. No rash noted. Nails show no clubbing.  No cyanosis or erythema.    Debilities/Disabilities Identified: None  Emotional Behavior: Appropriate     Results Review:   I reviewed the patient's new clinical results.    Lab Results (most recent)     None          Imaging Results (Most Recent)     None        reviewed    ECG/EMG Results (most recent)     None        reviewed    Assessment/Plan   Personal hx colon polyps/  colonoscopy      I discussed the patient's findings and my recommendations with patient.     Heath Naik MD  06/11/21  09:15 EDT    Time: 10 min prior to procedure.

## 2021-06-11 NOTE — ANESTHESIA PREPROCEDURE EVALUATION
Anesthesia Evaluation     Patient summary reviewed and Nursing notes reviewed   NPO Solid Status: > 8 hours  NPO Liquid Status: > 8 hours           Airway   Mallampati: II  No difficulty expected  Dental - normal exam     Pulmonary - negative pulmonary ROS and normal exam   Cardiovascular - normal exam    (+) hyperlipidemia,       Neuro/Psych- negative ROS  GI/Hepatic/Renal/Endo - negative ROS     Musculoskeletal     Abdominal  - normal exam   Substance History   (+) alcohol use,      OB/GYN negative ob/gyn ROS         Other   arthritis,                      Anesthesia Plan    ASA 2     MAC     intravenous induction     Anesthetic plan, all risks, benefits, and alternatives have been provided, discussed and informed consent has been obtained with: patient.  Use of blood products discussed with patient  Consented to blood products.

## 2021-06-14 LAB
LAB AP CASE REPORT: NORMAL
PATH REPORT.FINAL DX SPEC: NORMAL
PATH REPORT.GROSS SPEC: NORMAL

## 2021-06-18 ENCOUNTER — OFFICE VISIT (OUTPATIENT)
Dept: ORTHOPEDIC SURGERY | Facility: CLINIC | Age: 66
End: 2021-06-18

## 2021-06-18 VITALS — HEIGHT: 73 IN | BODY MASS INDEX: 28.63 KG/M2 | WEIGHT: 216 LBS

## 2021-06-18 DIAGNOSIS — M17.12 PRIMARY OSTEOARTHRITIS OF LEFT KNEE: Primary | ICD-10-CM

## 2021-06-18 PROCEDURE — 20610 DRAIN/INJ JOINT/BURSA W/O US: CPT | Performed by: ORTHOPAEDIC SURGERY

## 2021-06-18 RX ORDER — MELOXICAM 7.5 MG/1
7.5 TABLET ORAL DAILY
Qty: 30 TABLET | Refills: 5 | Status: SHIPPED | OUTPATIENT
Start: 2021-06-18 | End: 2022-10-19

## 2021-06-18 NOTE — PROGRESS NOTES
Subjective: Osteoarthritis left knee     Patient ID: Chad Marte is a 66 y.o. male.    Chief Complaint:    History of Present Illness 66-year-old male seen for Monovisc injection into the left knee       Social History     Occupational History   • Not on file   Tobacco Use   • Smoking status: Never Smoker   • Smokeless tobacco: Never Used   Vaping Use   • Vaping Use: Never used   Substance and Sexual Activity   • Alcohol use: Yes     Alcohol/week: 3.0 standard drinks     Types: 3 Glasses of wine per week   • Drug use: No   • Sexual activity: Defer      Review of Systems   Constitutional: Negative for chills, diaphoresis, fever and unexpected weight change.   HENT: Negative for hearing loss, nosebleeds, sore throat and tinnitus.    Eyes: Negative for pain and visual disturbance.   Respiratory: Negative for cough, shortness of breath and wheezing.    Cardiovascular: Negative for chest pain and palpitations.   Gastrointestinal: Negative for abdominal pain, diarrhea, nausea and vomiting.   Endocrine: Negative for cold intolerance, heat intolerance and polydipsia.   Genitourinary: Negative for difficulty urinating, dysuria and hematuria.   Musculoskeletal: Positive for arthralgias and gait problem.   Skin: Negative for rash and wound.   Allergic/Immunologic: Negative for environmental allergies.   Neurological: Negative for dizziness, syncope and numbness.   Hematological: Does not bruise/bleed easily.   Psychiatric/Behavioral: Negative for dysphoric mood and sleep disturbance. The patient is not nervous/anxious.          Past Medical History:   Diagnosis Date   • Arthritis    • Hypercholesteremia    • Knee swelling    • Rotator cuff syndrome    • Tear of meniscus of knee    • Welcome to Medicare preventive visit 3/28/2016     Past Surgical History:   Procedure Laterality Date   • COLONOSCOPY N/A 5/27/2016    Procedure: COLONOSCOPY / polypectomy;  Surgeon: Heath Naik MD;  Location: [facility];  Service:    •  COLONOSCOPY W/ POLYPECTOMY N/A 6/11/2021    Procedure: COLONOSCOPY WITH POLYPECTOMY;  Surgeon: Heath Naik MD;  Location: [facility] OR;  Service: Gastroenterology;  Laterality: N/A;  cecal polyp  ascending colon polyps x2  rectal polyp   • FRACTURE SURGERY Right     R arm- plate   • HERNIA REPAIR     • TENDON TRANSFER Right     hand   • TONSILLECTOMY       Family History   Problem Relation Age of Onset   • Cancer Father          Objective:  There were no vitals filed for this visit.      06/18/21  0837   Weight: 98 kg (216 lb)     Body mass index is 28.5 kg/m².        Ortho Exam   6 mL injection given to the superolateral portal tolerating it well.  Postinjection instructions given to the patient.    Assessment:        1. Primary osteoarthritis of left knee           Plan: Return to see me as needed.  Answered all questions      Large Joint Arthrocentesis: L knee  Date/Time: 6/18/2021 8:39 AM  Consent given by: patient  Site marked: site marked  Timeout: Immediately prior to procedure a time out was called to verify the correct patient, procedure, equipment, support staff and site/side marked as required   Supporting Documentation  Indications: pain   Procedure Details  Location: knee - L knee  Preparation: Patient was prepped and draped in the usual sterile fashion  Needle size: 20 G  Medications administered: 88 mg Hyaluronan 88 MG/4ML  Patient tolerance: patient tolerated the procedure well with no immediate complications            Work Status:    SANDHYA query complete.    Orders:  Orders Placed This Encounter   Procedures   • Large Joint Arthrocentesis: L knee       Medications:  New Medications Ordered This Visit   Medications   • meloxicam (MOBIC) 7.5 MG tablet     Sig: Take 1 tablet by mouth Daily.     Dispense:  30 tablet     Refill:  5       Followup:  Return if symptoms worsen or fail to improve.          Dictated utilizing Dragon dictation

## 2022-01-26 DIAGNOSIS — E78.5 HYPERLIPIDEMIA LDL GOAL <100: ICD-10-CM

## 2022-01-26 RX ORDER — ROSUVASTATIN CALCIUM 10 MG/1
TABLET, COATED ORAL
Qty: 90 TABLET | Refills: 3 | Status: SHIPPED | OUTPATIENT
Start: 2022-01-26 | End: 2022-10-19 | Stop reason: SDUPTHER

## 2022-03-10 ENCOUNTER — TELEPHONE (OUTPATIENT)
Dept: INTERNAL MEDICINE | Facility: CLINIC | Age: 67
End: 2022-03-10

## 2022-03-12 DIAGNOSIS — E78.5 HYPERLIPIDEMIA LDL GOAL <100: Primary | ICD-10-CM

## 2022-03-12 DIAGNOSIS — Z12.5 SCREENING PSA (PROSTATE SPECIFIC ANTIGEN): ICD-10-CM

## 2022-04-12 ENCOUNTER — OFFICE VISIT (OUTPATIENT)
Dept: ORTHOPEDIC SURGERY | Facility: CLINIC | Age: 67
End: 2022-04-12

## 2022-04-12 VITALS
WEIGHT: 216 LBS | HEART RATE: 75 BPM | HEIGHT: 73 IN | BODY MASS INDEX: 28.63 KG/M2 | SYSTOLIC BLOOD PRESSURE: 140 MMHG | DIASTOLIC BLOOD PRESSURE: 95 MMHG

## 2022-04-12 DIAGNOSIS — M25.561 RIGHT KNEE PAIN, UNSPECIFIED CHRONICITY: Primary | ICD-10-CM

## 2022-04-12 DIAGNOSIS — M17.12 PRIMARY OSTEOARTHRITIS OF LEFT KNEE: ICD-10-CM

## 2022-04-12 DIAGNOSIS — M17.11 PRIMARY OSTEOARTHRITIS OF RIGHT KNEE: ICD-10-CM

## 2022-04-12 PROCEDURE — 20610 DRAIN/INJ JOINT/BURSA W/O US: CPT | Performed by: ORTHOPAEDIC SURGERY

## 2022-04-12 PROCEDURE — 73562 X-RAY EXAM OF KNEE 3: CPT | Performed by: ORTHOPAEDIC SURGERY

## 2022-04-12 PROCEDURE — 99214 OFFICE O/P EST MOD 30 MIN: CPT | Performed by: ORTHOPAEDIC SURGERY

## 2022-04-12 RX ORDER — LIDOCAINE HYDROCHLORIDE 10 MG/ML
4 INJECTION, SOLUTION EPIDURAL; INFILTRATION; INTRACAUDAL; PERINEURAL
Status: COMPLETED | OUTPATIENT
Start: 2022-04-12 | End: 2022-04-12

## 2022-04-12 RX ORDER — TRIAMCINOLONE ACETONIDE 40 MG/ML
40 INJECTION, SUSPENSION INTRA-ARTICULAR; INTRAMUSCULAR
Status: COMPLETED | OUTPATIENT
Start: 2022-04-12 | End: 2022-04-12

## 2022-04-12 RX ADMIN — LIDOCAINE HYDROCHLORIDE 4 ML: 10 INJECTION, SOLUTION EPIDURAL; INFILTRATION; INTRACAUDAL; PERINEURAL at 14:20

## 2022-04-12 RX ADMIN — TRIAMCINOLONE ACETONIDE 40 MG: 40 INJECTION, SUSPENSION INTRA-ARTICULAR; INTRAMUSCULAR at 14:20

## 2022-04-12 NOTE — PROGRESS NOTES
Subjective: Bilateral knee pain right worse than left     Patient ID: Chad Marte is a 67 y.o. male.    Chief Complaint:    History of Present Illness 67-year male known to me presents for evaluation of new problem involving his right knee.  He was seen by me last June and had Monovisc injection in the left knee he did well with that until just recently is beginning to have some discomfort in that knee again but the right knee now is most symptomatic.  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  Is taken meloxicam getting no relief of his symptoms.  Pain is primarily with activity particularly kneeling crawling climbing steps getting in and out of a chair.  No specific history of trauma.       Social History     Occupational History   • Not on file   Tobacco Use   • Smoking status: Never Smoker   • Smokeless tobacco: Never Used   Vaping Use   • Vaping Use: Never used   Substance and Sexual Activity   • Alcohol use: Yes     Alcohol/week: 3.0 standard drinks     Types: 3 Glasses of wine per week   • Drug use: No   • Sexual activity: Defer      Review of Systems   Constitutional: Negative for chills, diaphoresis, fever and unexpected weight change.   HENT: Negative for hearing loss, nosebleeds, sore throat and tinnitus.    Eyes: Negative for pain and visual disturbance.   Respiratory: Negative for cough, shortness of breath and wheezing.    Cardiovascular: Negative for chest pain and palpitations.   Gastrointestinal: Negative for abdominal pain, diarrhea, nausea and vomiting.   Endocrine: Negative for cold intolerance, heat intolerance and polydipsia.   Genitourinary: Negative for difficulty urinating, dysuria and hematuria.   Musculoskeletal: Positive for arthralgias and myalgias. Negative for joint swelling.   Skin: Negative for rash and wound.   Allergic/Immunologic: Negative for environmental allergies.   Neurological:  Negative for dizziness, syncope and numbness.   Hematological: Does not bruise/bleed easily.   Psychiatric/Behavioral: Negative for dysphoric mood and sleep disturbance. The patient is not nervous/anxious.          Past Medical History:   Diagnosis Date   • Arthritis    • Hypercholesteremia    • Knee swelling    • Rotator cuff syndrome    • Tear of meniscus of knee    • Welcome to Medicare preventive visit 3/28/2016     Past Surgical History:   Procedure Laterality Date   • COLONOSCOPY N/A 5/27/2016    Procedure: COLONOSCOPY / polypectomy;  Surgeon: Heath Naik MD;  Location:  [facility];  Service:    • COLONOSCOPY W/ POLYPECTOMY N/A 6/11/2021    Procedure: COLONOSCOPY WITH POLYPECTOMY;  Surgeon: Heath Naik MD;  Location: [facility] OR;  Service: Gastroenterology;  Laterality: N/A;  cecal polyp  ascending colon polyps x2  rectal polyp   • FRACTURE SURGERY Right     R arm- plate   • HERNIA REPAIR     • TENDON TRANSFER Right     hand   • TONSILLECTOMY       Family History   Problem Relation Age of Onset   • Cancer Father          Objective:  Vitals:    04/12/22 1356   BP: 140/95   Pulse: 75         04/12/22  1356   Weight: 98 kg (216 lb)     Body mass index is 28.5 kg/m².        Ortho Exam   AP lateral sunrise view of the right knee does show some mild decrease in joint space and patellofemoral view with some arthritic changes noted in the medial facet but otherwise no acute changes noted.  No spur formation is seen.  Good joint spaces maintained on the AP.  No prior x-rays available for comparison.  He is alert and oriented x3.  Right knee shows no swelling effusion erythema and he has 0 to 125 degrees of motion with minimal patellofemoral crepitus and no pain with patellar compression.  Quad hamstring function 5/5.  No instability 0 90 degrees nor any varus or valgus instability at 10 to 30 degrees.  His calf is nontender.  Peripheral pulses no motor or sensory deficit good capillary  refill.  The left knee states is beginning to give him some discomfort again gel injection given last June is beginning to wear off he does have some crepitus with range of motion to the knee but there is no instability no swelling skin is cool to touch.  Good capillary refill on the left leg and his calf is nontender.    Assessment:        1. Right knee pain, unspecified chronicity    2. Primary osteoarthritis of left knee    3. Primary osteoarthritis of right knee           Plan: I reviewed the x-rays of the right knee with the patient and also of his left knee taken last year.  Reviewed the history and exam of the right knee.  After reviewing treatment options as he is already taken meloxicam we are can proceed with a cortisone injection to try to get rid of the inflammation caused by the increased activity in the past 3 to 4 weeks.  The right knee therefore was injected 4 cc lidocaine and 1 cc Kenalog.  With regard to the left knee and I obtain authorization to repeat the gel injection on the last for authorization of both the city can inject the right knee before symptomatic in the next 3 months.  Patient was in agreement.  Return to undergo gel injection in the left knee in the next 2 to 3 weeks.  Answered all questions  Large Joint Arthrocentesis: R knee  Date/Time: 4/12/2022 2:20 PM  Consent given by: patient  Site marked: site marked  Timeout: Immediately prior to procedure a time out was called to verify the correct patient, procedure, equipment, support staff and site/side marked as required   Supporting Documentation  Indications: pain   Procedure Details  Location: knee - R knee  Preparation: Patient was prepped and draped in the usual sterile fashion  Needle size: 22 G  Approach: superior (lateral)  Medications administered: 40 mg triamcinolone acetonide 40 MG/ML; 4 mL lidocaine PF 1% 1 %  Patient tolerance: patient tolerated the procedure well with no immediate complications                  Work  Status:    SANDHYA query complete.    Orders:  Orders Placed This Encounter   Procedures   • Large Joint Arthrocentesis: R knee   • XR Knee 3+ View With [place] Right   • Visco Treatment       Medications:  No orders of the defined types were placed in this encounter.      Followup:  Return in about 3 weeks (around 5/3/2022).          Dictated utilizing Dragon dictation   Answers for HPI/ROS submitted by the patient on 4/8/2022  What is the primary reason for your visit?: Other  Please describe your symptoms.: Pain in right knee  Have you had these symptoms before?: Yes  How long have you been having these symptoms?: Greater than 2 weeks  Please list any medications you are currently taking for this condition.: Meloxicam 7.5 mg

## 2022-04-29 ENCOUNTER — CLINICAL SUPPORT (OUTPATIENT)
Dept: ORTHOPEDIC SURGERY | Facility: CLINIC | Age: 67
End: 2022-04-29

## 2022-04-29 VITALS — WEIGHT: 216 LBS | HEIGHT: 73 IN | BODY MASS INDEX: 28.63 KG/M2

## 2022-04-29 DIAGNOSIS — M17.12 PRIMARY OSTEOARTHRITIS OF LEFT KNEE: Primary | ICD-10-CM

## 2022-04-29 PROCEDURE — 20610 DRAIN/INJ JOINT/BURSA W/O US: CPT | Performed by: ORTHOPAEDIC SURGERY

## 2022-04-29 NOTE — PROGRESS NOTES
Subjective: Osteoarthritis left knee     Patient ID: Chad Marte is a 67 y.o. male.    Chief Complaint:    History of Present Illness patient seen for Monovisc injection into the left knee.  The right knee is doing well for cortisone injection given several weeks ago       Social History     Occupational History   • Not on file   Tobacco Use   • Smoking status: Never Smoker   • Smokeless tobacco: Never Used   Vaping Use   • Vaping Use: Never used   Substance and Sexual Activity   • Alcohol use: Yes     Alcohol/week: 3.0 standard drinks     Types: 3 Glasses of wine per week   • Drug use: No   • Sexual activity: Defer      Review of Systems   Constitutional: Negative for chills, diaphoresis, fever and unexpected weight change.   HENT: Negative for hearing loss, nosebleeds, sore throat and tinnitus.    Eyes: Negative for pain and visual disturbance.   Respiratory: Negative for cough, shortness of breath and wheezing.    Cardiovascular: Negative for chest pain and palpitations.   Gastrointestinal: Negative for abdominal pain, diarrhea, nausea and vomiting.   Endocrine: Negative for cold intolerance, heat intolerance and polydipsia.   Genitourinary: Negative for difficulty urinating, dysuria and hematuria.   Musculoskeletal: Positive for arthralgias, joint swelling and myalgias.   Skin: Negative for rash and wound.   Allergic/Immunologic: Negative for environmental allergies.   Neurological: Negative for dizziness, syncope and numbness.   Hematological: Does not bruise/bleed easily.   Psychiatric/Behavioral: Negative for dysphoric mood and sleep disturbance. The patient is not nervous/anxious.          Past Medical History:   Diagnosis Date   • Arthritis    • Fracture, humerus    • Hypercholesteremia    • Knee swelling    • Rotator cuff syndrome    • Tear of meniscus of knee    • Welcome to Medicare preventive visit 03/28/2016     Past Surgical History:   Procedure Laterality Date   • COLONOSCOPY N/A 5/27/2016     Procedure: COLONOSCOPY / polypectomy;  Surgeon: Heath Naik MD;  Location: [facility] OR;  Service:    • COLONOSCOPY W/ POLYPECTOMY N/A 6/11/2021    Procedure: COLONOSCOPY WITH POLYPECTOMY;  Surgeon: Heath Naik MD;  Location: [facility] OR;  Service: Gastroenterology;  Laterality: N/A;  cecal polyp  ascending colon polyps x2  rectal polyp   • FRACTURE SURGERY Right     R arm- plate   • HERNIA REPAIR     • TENDON TRANSFER Right     hand   • TONSILLECTOMY       Family History   Problem Relation Age of Onset   • Cancer Father          Objective:  There were no vitals filed for this visit.      04/29/22  1129   Weight: 98 kg (216 lb)     Body mass index is 28.5 kg/m².        Ortho Exam   6 mL injection given through the superolateral portal after sterile prep without complications tolerating well.  Postinjection instructions given to the patient.    Assessment:        1. Primary osteoarthritis of left knee           Plan: Return to see me as needed.  He is patient was told that whenever the right knee begins to give him pain he can return for a gel injection.  Answered all questions    Large Joint Arthrocentesis: L knee  Date/Time: 4/29/2022 11:31 AM  Consent given by: patient  Site marked: site marked  Timeout: Immediately prior to procedure a time out was called to verify the correct patient, procedure, equipment, support staff and site/side marked as required   Supporting Documentation  Indications: pain   Procedure Details  Location: knee - L knee (bilateral knee)  Preparation: Patient was prepped and draped in the usual sterile fashion  Needle size: 20 G  Approach: superior  Medications administered: 88 mg Hyaluronan 88 MG/4ML  Patient tolerance: patient tolerated the procedure well with no immediate complications                    Work Status:    SANDHYA query complete.    Orders:  Orders Placed This Encounter   Procedures   • Large Joint Arthrocentesis: L knee       Medications:  No orders  of the defined types were placed in this encounter.      Followup:  Return if symptoms worsen or fail to improve.          Dictated utilizing Dragon dictation

## 2022-07-11 ENCOUNTER — OFFICE VISIT (OUTPATIENT)
Dept: INTERNAL MEDICINE | Facility: CLINIC | Age: 67
End: 2022-07-11

## 2022-07-11 VITALS
WEIGHT: 217.4 LBS | SYSTOLIC BLOOD PRESSURE: 134 MMHG | BODY MASS INDEX: 28.81 KG/M2 | HEART RATE: 69 BPM | OXYGEN SATURATION: 98 % | HEIGHT: 73 IN | TEMPERATURE: 98.4 F | DIASTOLIC BLOOD PRESSURE: 72 MMHG

## 2022-07-11 DIAGNOSIS — R60.0 BILATERAL LOWER EXTREMITY EDEMA: Primary | ICD-10-CM

## 2022-07-11 PROCEDURE — 99213 OFFICE O/P EST LOW 20 MIN: CPT | Performed by: NURSE PRACTITIONER

## 2022-07-11 RX ORDER — MULTIVIT AND MINERALS-FERROUS GLUCONATE 9 MG IRON/15 ML ORAL LIQUID 9 MG/15 ML
LIQUID (ML) ORAL DAILY
COMMUNITY

## 2022-07-11 RX ORDER — CHLORAL HYDRATE 500 MG
CAPSULE ORAL
COMMUNITY

## 2022-07-11 NOTE — PROGRESS NOTES
"Chad Marte is a 67 y.o. male presenting today for   Chief Complaint   Patient presents with   • Foot Swelling     First noticed about 2.5 weeks ago, bilateral, swelled up into calf but now seem to be improving but still a little swollen.          Pt presents for an acute visit; his PCP is ELVIS Hawley.      Subjective    History of Present Illness     Pt c/o swelling in both feet x 2weeks. This is gradually resolving over the last 3 days. This seemed to be worse while he was travelling in [place]. He drinks approx 48 oz of water QD. He does try to avoid adding salt.    The following portions of the patient's history were reviewed and updated as appropriate: allergies, current medications, problem list, past medical history, past surgical history, family history, and social history.    Review of Systems   Respiratory: Negative for cough, shortness of breath and wheezing.    Cardiovascular: Positive for leg swelling. Negative for chest pain and palpitations.   Genitourinary: Negative for dysuria, frequency, hematuria and urgency.         Objective    Vitals:    07/11/22 1237   BP: 134/72   Pulse: 69   Temp: 98.4 °F (36.9 °C)   SpO2: 98%   Weight: 98.6 kg (217 lb 6.4 oz)   Height: 185.4 cm (73\")     Body mass index is 28.68 kg/m².  Nursing notes and vitals reviewed.    Physical Exam  Constitutional:       General: He is not in acute distress.     Appearance: He is well-developed.   Cardiovascular:      Rate and Rhythm: Regular rhythm.      Pulses: Normal pulses.      Heart sounds: Normal heart sounds.   Pulmonary:      Effort: Pulmonary effort is normal.      Breath sounds: Normal breath sounds.   Musculoskeletal:      Right lower leg: No edema.      Left lower leg: No edema.   Neurological:      Mental Status: He is alert and oriented to person, place, and time.   Psychiatric:         Attention and Perception: He is attentive.         Mood and Affect: Mood and affect normal.         Speech: Speech normal.         Behavior: " Behavior normal.         Thought Content: Thought content normal.           Assessment and Plan    Diagnoses and all orders for this visit:    1. Bilateral lower extremity edema (Primary)        1. This appears resolved. Pt will continue to monitor. Discussed increasing water intake and decreasing salt intake.      Medications, including side effects, were discussed with the patient. Patient verbalized understanding.  The plan of care was discussed. All questions were answered. Patient verbalized understanding.        Return if symptoms worsen or fail to improve.

## 2022-07-12 ENCOUNTER — LAB (OUTPATIENT)
Dept: INTERNAL MEDICINE | Facility: CLINIC | Age: 67
End: 2022-07-12

## 2022-07-12 DIAGNOSIS — Z12.5 SCREENING PSA (PROSTATE SPECIFIC ANTIGEN): ICD-10-CM

## 2022-07-12 DIAGNOSIS — E78.5 HYPERLIPIDEMIA LDL GOAL <100: ICD-10-CM

## 2022-07-13 LAB
ALBUMIN SERPL-MCNC: 3.2 G/DL (ref 3.8–4.8)
ALBUMIN/GLOB SERPL: 1.4 {RATIO} (ref 1.2–2.2)
ALP SERPL-CCNC: 64 IU/L (ref 44–121)
ALT SERPL-CCNC: 17 IU/L (ref 0–44)
AST SERPL-CCNC: 17 IU/L (ref 0–40)
BASOPHILS # BLD AUTO: 0 X10E3/UL (ref 0–0.2)
BASOPHILS NFR BLD AUTO: 0 %
BILIRUB SERPL-MCNC: 0.7 MG/DL (ref 0–1.2)
BUN SERPL-MCNC: 20 MG/DL (ref 8–27)
BUN/CREAT SERPL: 25 (ref 10–24)
CALCIUM SERPL-MCNC: 8.7 MG/DL (ref 8.6–10.2)
CHLORIDE SERPL-SCNC: 105 MMOL/L (ref 96–106)
CHOLEST SERPL-MCNC: 258 MG/DL (ref 100–199)
CHOLEST/HDLC SERPL: 4 RATIO (ref 0–5)
CO2 SERPL-SCNC: 28 MMOL/L (ref 20–29)
CREAT SERPL-MCNC: 0.8 MG/DL (ref 0.76–1.27)
EGFRCR SERPLBLD CKD-EPI 2021: 96 ML/MIN/1.73
EOSINOPHIL # BLD AUTO: 0.1 X10E3/UL (ref 0–0.4)
EOSINOPHIL NFR BLD AUTO: 2 %
ERYTHROCYTE [DISTWIDTH] IN BLOOD BY AUTOMATED COUNT: 12.9 % (ref 11.6–15.4)
GLOBULIN SER CALC-MCNC: 2.3 G/DL (ref 1.5–4.5)
GLUCOSE SERPL-MCNC: 85 MG/DL (ref 65–99)
HCT VFR BLD AUTO: 43.4 % (ref 37.5–51)
HDLC SERPL-MCNC: 65 MG/DL
HGB BLD-MCNC: 14.4 G/DL (ref 13–17.7)
IMM GRANULOCYTES # BLD AUTO: 0 X10E3/UL (ref 0–0.1)
IMM GRANULOCYTES NFR BLD AUTO: 1 %
LDLC SERPL CALC-MCNC: 183 MG/DL (ref 0–99)
LYMPHOCYTES # BLD AUTO: 1.3 X10E3/UL (ref 0.7–3.1)
LYMPHOCYTES NFR BLD AUTO: 24 %
MCH RBC QN AUTO: 29.4 PG (ref 26.6–33)
MCHC RBC AUTO-ENTMCNC: 33.2 G/DL (ref 31.5–35.7)
MCV RBC AUTO: 89 FL (ref 79–97)
MONOCYTES # BLD AUTO: 0.4 X10E3/UL (ref 0.1–0.9)
MONOCYTES NFR BLD AUTO: 7 %
NEUTROPHILS # BLD AUTO: 3.5 X10E3/UL (ref 1.4–7)
NEUTROPHILS NFR BLD AUTO: 66 %
PLATELET # BLD AUTO: 263 X10E3/UL (ref 150–450)
POTASSIUM SERPL-SCNC: 5 MMOL/L (ref 3.5–5.2)
PROT SERPL-MCNC: 5.5 G/DL (ref 6–8.5)
PSA SERPL-MCNC: 0.7 NG/ML (ref 0–4)
RBC # BLD AUTO: 4.89 X10E6/UL (ref 4.14–5.8)
SODIUM SERPL-SCNC: 142 MMOL/L (ref 134–144)
TRIGL SERPL-MCNC: 64 MG/DL (ref 0–149)
VLDLC SERPL CALC-MCNC: 10 MG/DL (ref 5–40)
WBC # BLD AUTO: 5.3 X10E3/UL (ref 3.4–10.8)

## 2022-10-10 ENCOUNTER — TELEPHONE (OUTPATIENT)
Dept: ORTHOPEDIC SURGERY | Facility: CLINIC | Age: 67
End: 2022-10-10

## 2022-10-13 ENCOUNTER — OFFICE VISIT (OUTPATIENT)
Dept: ORTHOPEDIC SURGERY | Facility: CLINIC | Age: 67
End: 2022-10-13

## 2022-10-13 VITALS — HEIGHT: 73 IN | WEIGHT: 217 LBS | BODY MASS INDEX: 28.76 KG/M2

## 2022-10-13 DIAGNOSIS — M17.12 PRIMARY OSTEOARTHRITIS OF LEFT KNEE: Primary | ICD-10-CM

## 2022-10-13 DIAGNOSIS — M17.11 PRIMARY OSTEOARTHRITIS OF RIGHT KNEE: ICD-10-CM

## 2022-10-13 PROCEDURE — 99213 OFFICE O/P EST LOW 20 MIN: CPT | Performed by: ORTHOPAEDIC SURGERY

## 2022-10-13 PROCEDURE — 20610 DRAIN/INJ JOINT/BURSA W/O US: CPT | Performed by: ORTHOPAEDIC SURGERY

## 2022-10-13 RX ORDER — LIDOCAINE HYDROCHLORIDE 10 MG/ML
4 INJECTION, SOLUTION EPIDURAL; INFILTRATION; INTRACAUDAL; PERINEURAL
Status: COMPLETED | OUTPATIENT
Start: 2022-10-13 | End: 2022-10-13

## 2022-10-13 RX ORDER — TRIAMCINOLONE ACETONIDE 40 MG/ML
80 INJECTION, SUSPENSION INTRA-ARTICULAR; INTRAMUSCULAR
Status: COMPLETED | OUTPATIENT
Start: 2022-10-13 | End: 2022-10-13

## 2022-10-13 RX ADMIN — LIDOCAINE HYDROCHLORIDE 4 ML: 10 INJECTION, SOLUTION EPIDURAL; INFILTRATION; INTRACAUDAL; PERINEURAL at 13:37

## 2022-10-13 RX ADMIN — TRIAMCINOLONE ACETONIDE 80 MG: 40 INJECTION, SUSPENSION INTRA-ARTICULAR; INTRAMUSCULAR at 13:37

## 2022-10-13 NOTE — PROGRESS NOTES
Subjective: Bilateral knee pain     Patient ID: Chad Marte is a 67 y.o. male.    Chief Complaint:    History of Present Illness 67-year-old male returns for treatment of bilateral knee pain.  Was seen back in April had a cortisone injection in the right and a gel injection in the left knee.  Both knees have done well until just recently and the right knee now is giving him more pain actually than the left knee.  Discomfort with actives of daily living.  He is currently taking meloxicam with no relief of his symptoms.       Social History     Occupational History   • Not on file   Tobacco Use   • Smoking status: Never   • Smokeless tobacco: Never   Vaping Use   • Vaping Use: Never used   Substance and Sexual Activity   • Alcohol use: Yes     Alcohol/week: 3.0 standard drinks     Types: 3 Glasses of wine per week   • Drug use: No   • Sexual activity: Defer      Review of Systems   Constitutional: Negative for chills, diaphoresis, fever and unexpected weight change.   HENT: Negative for hearing loss, nosebleeds, sore throat and tinnitus.    Eyes: Negative for pain and visual disturbance.   Respiratory: Negative for cough, shortness of breath and wheezing.    Cardiovascular: Negative for chest pain and palpitations.   Gastrointestinal: Negative for abdominal pain, diarrhea, nausea and vomiting.   Endocrine: Negative for cold intolerance, heat intolerance and polydipsia.   Genitourinary: Negative for difficulty urinating, dysuria and hematuria.   Musculoskeletal: Positive for arthralgias and myalgias. Negative for joint swelling.   Skin: Negative for rash and wound.   Allergic/Immunologic: Negative for environmental allergies.   Neurological: Negative for dizziness, syncope and numbness.   Hematological: Does not bruise/bleed easily.   Psychiatric/Behavioral: Negative for dysphoric mood and sleep disturbance. The patient is not nervous/anxious.          Past Medical History:   Diagnosis Date   • Arthritis    • Fracture,  humerus    • Hypercholesteremia    • Knee swelling    • Rotator cuff syndrome    • Tear of meniscus of knee    • Welcome to Medicare preventive visit 03/28/2016     Past Surgical History:   Procedure Laterality Date   • COLONOSCOPY N/A 5/27/2016    Procedure: COLONOSCOPY / polypectomy;  Surgeon: Heath Naik MD;  Location:  [facility] OR;  Service:    • COLONOSCOPY W/ POLYPECTOMY N/A 6/11/2021    Procedure: COLONOSCOPY WITH POLYPECTOMY;  Surgeon: Heath Naik MD;  Location:  [facility] OR;  Service: Gastroenterology;  Laterality: N/A;  cecal polyp  ascending colon polyps x2  rectal polyp   • FRACTURE SURGERY Right     R arm- plate   • HERNIA REPAIR     • TENDON TRANSFER Right     hand   • TONSILLECTOMY       Family History   Problem Relation Age of Onset   • Cancer Father          Objective:  There were no vitals filed for this visit.      10/13/22  1326   Weight: 98.4 kg (217 lb)     Body mass index is 28.63 kg/m².        Ortho Exam   Right knee shows no swelling effusion.  There is pain and crepitance with range of motion but there is no instability.  Calf is nontender.  Left knee shows mild crepitus with range of motion again there is no instability throughout the arc of motion and the calf remains nontender [place] distal pulses no motor or sensory deficit.  Tolerating meloxicam.    Assessment:        1. Primary osteoarthritis of left knee    2. Primary osteoarthritis of right knee           Plan: We will proceed with a repeat cortisone injection into the right knee with 4 cc of lidocaine and 2 cc of Kenalog that was given after sterile prep..  Postinjection instructions given.  With regard to the left knee will contact insurance company regarding repeat gel injection and return to undergo that injection once approval has been obtained.  Answered all questions.  Meantime continue meloxicam.    Large Joint Arthrocentesis: R knee  Date/Time: 10/13/2022 1:37 PM  Consent given by: patient  Site  marked: site marked  Timeout: Immediately prior to procedure a time out was called to verify the correct patient, procedure, equipment, support staff and site/side marked as required   Supporting Documentation  Indications: pain   Procedure Details  Location: knee - R knee  Preparation: Patient was prepped and draped in the usual sterile fashion  Needle size: 22 G  Approach: superior  Medications administered: 80 mg triamcinolone acetonide 40 MG/ML; 4 mL lidocaine PF 1% 1 %  Patient tolerance: patient tolerated the procedure well with no immediate complications                    Work Status:    SANDHYA query complete.    Orders:  Orders Placed This Encounter   Procedures   • Large Joint Arthrocentesis: R knee   • Visco Treatment       Medications:  No orders of the defined types were placed in this encounter.      Followup:  Return in about 3 weeks (around 11/3/2022).          Dictated utilizing Dragon dictation

## 2022-10-17 DIAGNOSIS — E78.5 HYPERLIPIDEMIA LDL GOAL <100: Primary | ICD-10-CM

## 2022-10-18 LAB
ALBUMIN SERPL-MCNC: 3.4 G/DL (ref 3.5–5.2)
ALBUMIN/GLOB SERPL: 2 G/DL
ALP SERPL-CCNC: 48 U/L (ref 39–117)
ALT SERPL-CCNC: 24 U/L (ref 1–41)
AST SERPL-CCNC: 19 U/L (ref 1–40)
BILIRUB SERPL-MCNC: 0.6 MG/DL (ref 0–1.2)
BUN SERPL-MCNC: 19 MG/DL (ref 8–23)
BUN/CREAT SERPL: 25 (ref 7–25)
CALCIUM SERPL-MCNC: 8.3 MG/DL (ref 8.6–10.5)
CHLORIDE SERPL-SCNC: 105 MMOL/L (ref 98–107)
CHOLEST SERPL-MCNC: 229 MG/DL (ref 0–200)
CHOLEST/HDLC SERPL: 3.01 {RATIO}
CO2 SERPL-SCNC: 28.8 MMOL/L (ref 22–29)
CREAT SERPL-MCNC: 0.76 MG/DL (ref 0.76–1.27)
EGFRCR SERPLBLD CKD-EPI 2021: 97.3 ML/MIN/1.73
GLOBULIN SER CALC-MCNC: 1.7 GM/DL
GLUCOSE SERPL-MCNC: 93 MG/DL (ref 65–99)
HDLC SERPL-MCNC: 76 MG/DL (ref 40–60)
LDLC SERPL CALC-MCNC: 138 MG/DL (ref 0–100)
POTASSIUM SERPL-SCNC: 4.4 MMOL/L (ref 3.5–5.2)
PROT SERPL-MCNC: 5.1 G/DL (ref 6–8.5)
SODIUM SERPL-SCNC: 141 MMOL/L (ref 136–145)
TRIGL SERPL-MCNC: 88 MG/DL (ref 0–150)
VLDLC SERPL CALC-MCNC: 15 MG/DL (ref 5–40)

## 2022-10-19 ENCOUNTER — OFFICE VISIT (OUTPATIENT)
Dept: INTERNAL MEDICINE | Facility: CLINIC | Age: 67
End: 2022-10-19

## 2022-10-19 VITALS
SYSTOLIC BLOOD PRESSURE: 142 MMHG | HEART RATE: 58 BPM | WEIGHT: 214.2 LBS | BODY MASS INDEX: 28.39 KG/M2 | RESPIRATION RATE: 20 BRPM | HEIGHT: 73 IN | TEMPERATURE: 96.8 F | DIASTOLIC BLOOD PRESSURE: 76 MMHG | OXYGEN SATURATION: 98 %

## 2022-10-19 DIAGNOSIS — M15.9 PRIMARY OSTEOARTHRITIS INVOLVING MULTIPLE JOINTS: ICD-10-CM

## 2022-10-19 DIAGNOSIS — E78.5 HYPERLIPIDEMIA LDL GOAL <100: Primary | ICD-10-CM

## 2022-10-19 DIAGNOSIS — Z23 NEED FOR VACCINATION: ICD-10-CM

## 2022-10-19 PROCEDURE — 90662 IIV NO PRSV INCREASED AG IM: CPT | Performed by: NURSE PRACTITIONER

## 2022-10-19 PROCEDURE — G0008 ADMIN INFLUENZA VIRUS VAC: HCPCS | Performed by: NURSE PRACTITIONER

## 2022-10-19 PROCEDURE — 99213 OFFICE O/P EST LOW 20 MIN: CPT | Performed by: NURSE PRACTITIONER

## 2022-10-19 RX ORDER — SENNOSIDES 8.6 MG
650 CAPSULE ORAL EVERY 8 HOURS PRN
Start: 2022-10-19

## 2022-10-19 RX ORDER — ROSUVASTATIN CALCIUM 20 MG/1
20 TABLET, COATED ORAL NIGHTLY
Qty: 90 TABLET | Refills: 1 | Status: SHIPPED | OUTPATIENT
Start: 2022-10-19 | End: 2023-03-27

## 2022-10-19 RX ORDER — MELOXICAM 15 MG/1
15 TABLET ORAL DAILY
Qty: 90 TABLET | Refills: 1 | Status: SHIPPED | OUTPATIENT
Start: 2022-10-19

## 2022-11-01 ENCOUNTER — CLINICAL SUPPORT (OUTPATIENT)
Dept: ORTHOPEDIC SURGERY | Facility: CLINIC | Age: 67
End: 2022-11-01

## 2022-11-01 VITALS — HEIGHT: 73 IN | BODY MASS INDEX: 28.36 KG/M2 | WEIGHT: 214 LBS

## 2022-11-01 DIAGNOSIS — M17.12 PRIMARY OSTEOARTHRITIS OF LEFT KNEE: Primary | ICD-10-CM

## 2022-11-01 PROCEDURE — 20610 DRAIN/INJ JOINT/BURSA W/O US: CPT | Performed by: ORTHOPAEDIC SURGERY

## 2022-11-01 NOTE — PROGRESS NOTES
Subjective: Osteoarthritis left knee     Patient ID: Chad Marte is a 67 y.o. male.    Chief Complaint:    History of Present Illness 67-year-old male is seen for Monovisc injection into the left knee       Social History     Occupational History   • Not on file   Tobacco Use   • Smoking status: Never   • Smokeless tobacco: Never   Vaping Use   • Vaping Use: Never used   Substance and Sexual Activity   • Alcohol use: Yes     Alcohol/week: 5.0 standard drinks     Types: 3 Glasses of wine, 2 Cans of beer per week   • Drug use: No   • Sexual activity: Yes     Partners: Female     Birth control/protection: None, Vasectomy      Review of Systems      Past Medical History:   Diagnosis Date   • Arthritis    • Fracture, humerus    • Hypercholesteremia    • Knee swelling    • Periarthritis of shoulder    • Rotator cuff syndrome    • Tear of meniscus of knee    • Welcome to Medicare preventive visit 03/28/2016     Past Surgical History:   Procedure Laterality Date   • COLONOSCOPY N/A 05/27/2016    Procedure: COLONOSCOPY / polypectomy;  Surgeon: Heath Naik MD;  Location:  [facility];  Service:    • COLONOSCOPY W/ POLYPECTOMY N/A 06/11/2021    Procedure: COLONOSCOPY WITH POLYPECTOMY;  Surgeon: Heath Naik MD;  Location:  [facility] OR;  Service: Gastroenterology;  Laterality: N/A;  cecal polyp  ascending colon polyps x2  rectal polyp   • FRACTURE SURGERY Right     R arm- plate   • HAND SURGERY  1996   • HERNIA REPAIR     • TENDON TRANSFER Right     hand   • TONSILLECTOMY       Family History   Problem Relation Age of Onset   • Cancer Father          Objective:  There were no vitals filed for this visit.      11/01/22  1327   Weight: 97.1 kg (214 lb)     Body mass index is 28.23 kg/m².        Ortho Exam   Is alert and oriented x3.  The left knee was injected with a 6 mL Monovisc after sterile prepping through the superolateral portal.  Postinjection instructions given to patient.    Assessment:        1.  Primary osteoarthritis of left knee           Plan: Return to see me on an as-needed basis.  Answered all questions  Large Joint Arthrocentesis: L knee  Date/Time: 11/1/2022 1:28 PM  Consent given by: patient  Site marked: site marked  Timeout: Immediately prior to procedure a time out was called to verify the correct patient, procedure, equipment, support staff and site/side marked as required   Supporting Documentation  Indications: pain   Procedure Details  Location: knee - L knee  Preparation: Patient was prepped and draped in the usual sterile fashion  Needle gauge: 21G.  Approach: superior (lateral)  Medications administered: 88 mg Hyaluronan 88 MG/4ML  Patient tolerance: patient tolerated the procedure well with no immediate complications      MONOVISC PROVIDED VIA [facility].            Work Status:    SANDHYA query complete.    Orders:  Orders Placed This Encounter   Procedures   • Large Joint Arthrocentesis: L knee       Medications:  No orders of the defined types were placed in this encounter.      Followup:  Return if symptoms worsen or fail to improve.          Dictated utilizing Dragon dictation

## 2023-02-07 ENCOUNTER — TELEPHONE (OUTPATIENT)
Dept: ORTHOPEDIC SURGERY | Facility: CLINIC | Age: 68
End: 2023-02-07

## 2023-02-07 NOTE — TELEPHONE ENCOUNTER
Caller: RADHAMES ROBERTS    Relationship to patient: SELF    Best call back number: [phone]    Chief complaint: RIGHT KNEE INJECTION    Type of visit: RIGHT KNEE INJECTION    Requested date: NA     If rescheduling, when is the original appointment: NA     Additional notes: NA

## 2023-02-09 ENCOUNTER — CLINICAL SUPPORT (OUTPATIENT)
Dept: ORTHOPEDIC SURGERY | Facility: CLINIC | Age: 68
End: 2023-02-09
Payer: MEDICARE

## 2023-02-09 VITALS — HEIGHT: 73 IN | BODY MASS INDEX: 29.29 KG/M2 | WEIGHT: 221 LBS

## 2023-02-09 DIAGNOSIS — M17.12 PRIMARY OSTEOARTHRITIS OF LEFT KNEE: ICD-10-CM

## 2023-02-09 DIAGNOSIS — M17.11 PRIMARY OSTEOARTHRITIS OF RIGHT KNEE: Primary | ICD-10-CM

## 2023-02-09 PROCEDURE — 99213 OFFICE O/P EST LOW 20 MIN: CPT | Performed by: ORTHOPAEDIC SURGERY

## 2023-02-09 NOTE — PROGRESS NOTES
Subjective: Right knee pain     Patient ID: Chad Marte is a 67 y.o. male.    Chief Complaint:    History of Present Illness 67-year-old male returns to discuss treatment options regarding his right knee.  Again he underwent a cortisone injection 1 November and although initially got some relief he started to have pain once again.  At that time he also went a gel injection in the left knee and is doing well.  He is currently taking meloxicam and tolerating it well but is not helping with increasing pain developing to the right knee.       Social History     Occupational History   • Not on file   Tobacco Use   • Smoking status: Never   • Smokeless tobacco: Never   Vaping Use   • Vaping Use: Never used   Substance and Sexual Activity   • Alcohol use: Yes     Alcohol/week: 5.0 standard drinks     Types: 3 Glasses of wine, 2 Cans of beer per week   • Drug use: No   • Sexual activity: Yes     Partners: Female     Birth control/protection: None, Vasectomy      Review of Systems      Past Medical History:   Diagnosis Date   • Arthritis    • Fracture, humerus    • Frozen shoulder    • Hypercholesteremia    • Knee swelling    • Periarthritis of shoulder    • Rotator cuff syndrome    • Tear of meniscus of knee    • Welcome to Medicare preventive visit 03/28/2016     Past Surgical History:   Procedure Laterality Date   • COLONOSCOPY N/A 05/27/2016    Procedure: COLONOSCOPY / polypectomy;  Surgeon: Heath Naik MD;  Location: [facility] OR;  Service:    • COLONOSCOPY W/ POLYPECTOMY N/A 06/11/2021    Procedure: COLONOSCOPY WITH POLYPECTOMY;  Surgeon: Heath Naik MD;  Location: [facility] OR;  Service: Gastroenterology;  Laterality: N/A;  cecal polyp  ascending colon polyps x2  rectal polyp   • FRACTURE SURGERY Right     R arm- plate   • HAND SURGERY  1996   • HERNIA REPAIR     • TENDON TRANSFER Right     hand   • TONSILLECTOMY       Family History   Problem Relation Age of Onset   • Cancer Father           Objective:  There were no vitals filed for this visit.      02/09/23  0954   Weight: 100 kg (221 lb)     Body mass index is 29.16 kg/m².        Ortho Exam   Is alert and oriented x3.  The knee shows no swelling effusion erythema but there is pain and crepitus with range of motion to 0 to 125 degrees but there is no instability throughout the arc of motion.  Quad and hamstring function 5/5.  Calf is nontender.  Mild medial joint line tenderness but negative Marie's.  Good distal pulses no motor or sensory deficit.    Assessment:        1. Primary osteoarthritis of right knee    2. Primary osteoarthritis of left knee           Plan: Discussed with the patient treatment options going forward.  Again the cortisone does help but is not getting substantial relief at this time so supposed to repeat in the cortisone injection really content insurance company regarding doing the gel injection in the right knee.  Return for that injection once approval has been obtained.  As far as the left knee he will call around 1 April regarding authorization to do repeat the gel injection in that left.  Patient was in agreement.  Answered      Procedures         Work Status:    [facility] query complete.    Orders:  Orders Placed This Encounter   Procedures   • Visco Treatment       Medications:  No orders of the defined types were placed in this encounter.      Followup:  Return in about 3 weeks (around 3/2/2023).             Dictated utilizing Dragon dictation

## 2023-02-28 ENCOUNTER — CLINICAL SUPPORT (OUTPATIENT)
Dept: ORTHOPEDIC SURGERY | Facility: CLINIC | Age: 68
End: 2023-02-28
Payer: MEDICARE

## 2023-02-28 VITALS — HEIGHT: 73 IN | WEIGHT: 221 LBS | BODY MASS INDEX: 29.29 KG/M2

## 2023-02-28 DIAGNOSIS — M17.11 PRIMARY OSTEOARTHRITIS OF RIGHT KNEE: Primary | ICD-10-CM

## 2023-02-28 DIAGNOSIS — M17.12 PRIMARY OSTEOARTHRITIS OF LEFT KNEE: ICD-10-CM

## 2023-02-28 PROCEDURE — 20610 DRAIN/INJ JOINT/BURSA W/O US: CPT | Performed by: ORTHOPAEDIC SURGERY

## 2023-02-28 RX ORDER — LIDOCAINE HYDROCHLORIDE 10 MG/ML
4 INJECTION, SOLUTION EPIDURAL; INFILTRATION; INTRACAUDAL; PERINEURAL
Status: COMPLETED | OUTPATIENT
Start: 2023-02-28 | End: 2023-02-28

## 2023-02-28 RX ORDER — TRIAMCINOLONE ACETONIDE 40 MG/ML
80 INJECTION, SUSPENSION INTRA-ARTICULAR; INTRAMUSCULAR
Status: COMPLETED | OUTPATIENT
Start: 2023-02-28 | End: 2023-02-28

## 2023-02-28 RX ADMIN — TRIAMCINOLONE ACETONIDE 80 MG: 40 INJECTION, SUSPENSION INTRA-ARTICULAR; INTRAMUSCULAR at 09:11

## 2023-02-28 RX ADMIN — LIDOCAINE HYDROCHLORIDE 4 ML: 10 INJECTION, SOLUTION EPIDURAL; INFILTRATION; INTRACAUDAL; PERINEURAL at 09:11

## 2023-02-28 NOTE — PROGRESS NOTES
Subjective: Osteoarthritis both knees     Patient ID: Chad Marte is a 68 y.o. male.    Chief Complaint:    History of Present Illness 68-year-old male is seen for gel injection to the right knee and a cortisone injection into his left knee.       Social History     Occupational History   • Not on file   Tobacco Use   • Smoking status: Never   • Smokeless tobacco: Never   Vaping Use   • Vaping Use: Never used   Substance and Sexual Activity   • Alcohol use: Yes     Alcohol/week: 5.0 standard drinks     Types: 3 Glasses of wine, 2 Cans of beer per week   • Drug use: No   • Sexual activity: Yes     Partners: Female     Birth control/protection: None, Vasectomy      Review of Systems      Past Medical History:   Diagnosis Date   • Arthritis    • Fracture, humerus    • Frozen shoulder    • Hypercholesteremia    • Knee swelling    • Periarthritis of shoulder    • Rotator cuff syndrome    • Tear of meniscus of knee    • Welcome to Medicare preventive visit 03/28/2016     Past Surgical History:   Procedure Laterality Date   • COLONOSCOPY N/A 05/27/2016    Procedure: COLONOSCOPY / polypectomy;  Surgeon: Heath Naik MD;  Location:  [facility] OR;  Service:    • COLONOSCOPY W/ POLYPECTOMY N/A 06/11/2021    Procedure: COLONOSCOPY WITH POLYPECTOMY;  Surgeon: Heath Naik MD;  Location: [facility] OR;  Service: Gastroenterology;  Laterality: N/A;  cecal polyp  ascending colon polyps x2  rectal polyp   • FRACTURE SURGERY Right     R arm- plate   • HAND SURGERY  1996   • HERNIA REPAIR     • TENDON TRANSFER Right     hand   • TONSILLECTOMY       Family History   Problem Relation Age of Onset   • Cancer Father          Objective:  There were no vitals filed for this visit.      02/28/23  0904   Weight: 100 kg (221 lb)     Body mass index is 29.16 kg/m².        Ortho Exam   Is alert and oriented x3.  The right knee was injected after sterile prep of the 6 mL Monovisc injection.  The left knee was injected through  the superolateral portal after sterile prepping with 4 cc of lidocaine and 2 cc of Kenalog.  Postinjection instructions given to patient.    Assessment:        1. Primary osteoarthritis of right knee    2. Primary osteoarthritis of left knee           Plan: Return to see me as needed.  Answered all questions      Large Joint Arthrocentesis: R knee  Date/Time: 2/28/2023 9:05 AM  Consent given by: patient  Site marked: site marked  Timeout: Immediately prior to procedure a time out was called to verify the correct patient, procedure, equipment, support staff and site/side marked as required   Supporting Documentation  Indications: pain   Procedure Details  Location: knee - R knee  Preparation: Patient was prepped and draped in the usual sterile fashion  Needle gauge: 21g.  Approach: superior  Medications administered: 88 mg Hyaluronan 88 MG/4ML  Patient tolerance: patient tolerated the procedure well with no immediate complications    Large Joint Arthrocentesis: L knee  Date/Time: 2/28/2023 9:11 AM  Consent given by: patient  Site marked: site marked  Timeout: Immediately prior to procedure a time out was called to verify the correct patient, procedure, equipment, support staff and site/side marked as required   Supporting Documentation  Indications: pain   Procedure Details  Location: knee - L knee  Preparation: Patient was prepped and draped in the usual sterile fashion  Needle size: 22 G  Approach: superior (lateral)  Medications administered: 80 mg triamcinolone acetonide 40 MG/ML; 4 mL lidocaine PF 1% 1 %  Patient tolerance: patient tolerated the procedure well with no immediate complications              Work Status:    SANDHYA query complete.    Orders:  Orders Placed This Encounter   Procedures   • Large Joint Arthrocentesis: R knee   • Large Joint Arthrocentesis: L knee       Medications:  No orders of the defined types were placed in this encounter.      Followup:  Return if symptoms worsen or fail to  improve.          Dictated utilizing Dragon dictation

## 2023-03-26 DIAGNOSIS — E78.5 HYPERLIPIDEMIA LDL GOAL <100: ICD-10-CM

## 2023-03-27 RX ORDER — ROSUVASTATIN CALCIUM 20 MG/1
20 TABLET, COATED ORAL NIGHTLY
Qty: 90 TABLET | Refills: 3 | Status: SHIPPED | OUTPATIENT
Start: 2023-03-27

## 2023-06-01 ENCOUNTER — TELEPHONE (OUTPATIENT)
Dept: ORTHOPEDIC SURGERY | Facility: CLINIC | Age: 68
End: 2023-06-01

## 2023-06-06 ENCOUNTER — OFFICE VISIT (OUTPATIENT)
Dept: ORTHOPEDIC SURGERY | Facility: CLINIC | Age: 68
End: 2023-06-06
Payer: MEDICARE

## 2023-06-06 VITALS — WEIGHT: 221 LBS | BODY MASS INDEX: 29.29 KG/M2 | HEIGHT: 73 IN

## 2023-06-06 DIAGNOSIS — M17.12 PRIMARY OSTEOARTHRITIS OF LEFT KNEE: Primary | ICD-10-CM

## 2023-06-06 PROCEDURE — 99213 OFFICE O/P EST LOW 20 MIN: CPT | Performed by: ORTHOPAEDIC SURGERY

## 2023-06-06 NOTE — PROGRESS NOTES
Subjective: Left knee pain     Patient ID: Chad Marte is a 68 y.o. male.    Chief Complaint:    History of Present Illness 68-year-old male seen once again for his left knee.  Cortisone injection given back from February.  We did get his complete relief.  The right knee that underwent a gel injection is doing well.       Social History     Occupational History    Not on file   Tobacco Use    Smoking status: Never     Passive exposure: Never    Smokeless tobacco: Never   Vaping Use    Vaping Use: Never used   Substance and Sexual Activity    Alcohol use: Yes     Alcohol/week: 5.0 standard drinks     Types: 3 Glasses of wine, 2 Cans of beer per week    Drug use: No    Sexual activity: Yes     Partners: Female     Birth control/protection: None, Vasectomy      Review of Systems      Past Medical History:   Diagnosis Date    Arthritis     Fracture, humerus     Frozen shoulder     Hypercholesteremia     Knee swelling     Periarthritis of shoulder     Rotator cuff syndrome     Tear of meniscus of knee     Welcome to Medicare preventive visit 03/28/2016     Past Surgical History:   Procedure Laterality Date    COLONOSCOPY N/A 05/27/2016    Procedure: COLONOSCOPY / polypectomy;  Surgeon: Heath Naik MD;  Location:  [facility] OR;  Service:     COLONOSCOPY W/ POLYPECTOMY N/A 06/11/2021    Procedure: COLONOSCOPY WITH POLYPECTOMY;  Surgeon: Heath Naik MD;  Location:  [facility] OR;  Service: Gastroenterology;  Laterality: N/A;  cecal polyp  ascending colon polyps x2  rectal polyp    FRACTURE SURGERY Right     R arm- plate    HAND SURGERY  1996    HERNIA REPAIR      TENDON TRANSFER Right     hand    TONSILLECTOMY       Family History   Problem Relation Age of Onset    Cancer Father          Objective:  There were no vitals filed for this visit.      06/06/23  1037   Weight: 100 kg (221 lb)     Body mass index is 29.16 kg/m².        Ortho Exam   he is alert and oriented x3.  The left knee has a boggy  synovitis there is no effusion.  He has 0 to 125 degrees of motion with crepitus and pain but is no instability in flexion extension nor any varus or valgus instability at 1030 degrees.  Calf is nontender.  No motor or sensory deficit.  Skin is cool to touch    Assessment:        1. Primary osteoarthritis of left knee           Plan: This with the patient treatment options going forward.  The cortisone did give him substantial relief so working to proceed with gel injection on the left knee once approval has been obtained.  Patient was in agreement.  Return to the injection once that approval has been obtained.  He did inquire about knee replacement but I told him as long as the injections are working I would defer any surgery at this time.  He was in agreement            Work Status:    [facility] query complete.    Orders:  Orders Placed This Encounter   Procedures    XR Knee 3 View Left    Visco Treatment       Medications:  No orders of the defined types were placed in this encounter.      Followup:  Return in about 3 weeks (around 6/27/2023).          Dictated utilizing Dragon dictation

## 2023-07-25 DIAGNOSIS — E78.5 HYPERLIPIDEMIA LDL GOAL <100: Primary | ICD-10-CM

## 2023-07-25 DIAGNOSIS — Z12.5 ENCOUNTER FOR SCREENING FOR MALIGNANT NEOPLASM OF PROSTATE: ICD-10-CM

## 2023-07-25 DIAGNOSIS — Z12.11 ENCOUNTER FOR SCREENING FOR MALIGNANT NEOPLASM OF COLON: ICD-10-CM

## 2023-07-28 DIAGNOSIS — M15.9 PRIMARY OSTEOARTHRITIS INVOLVING MULTIPLE JOINTS: ICD-10-CM

## 2023-07-28 RX ORDER — MELOXICAM 15 MG/1
15 TABLET ORAL DAILY
Qty: 90 TABLET | Refills: 1 | Status: SHIPPED | OUTPATIENT
Start: 2023-07-28

## 2023-07-28 NOTE — TELEPHONE ENCOUNTER
Rx Refill Note  Requested Prescriptions     Pending Prescriptions Disp Refills    meloxicam (MOBIC) 15 MG tablet 90 tablet 1     Sig: Take 1 tablet by mouth Daily.      Last office visit with prescribing clinician: 10/19/2022   Last telemedicine visit with prescribing clinician: Visit date not found   Next office visit with prescribing clinician: 8/23/2023                         Would you like a call back once the refill request has been completed: [] Yes [] No    If the office needs to give you a call back, can they leave a voicemail: [] Yes [] No    Chantal Sweeney MA  07/28/23, 10:15 EDT

## 2023-07-28 NOTE — TELEPHONE ENCOUNTER
Caller: Chad Marte    Relationship: Self    Best call back number: [phone]    Requested Prescriptions:   Requested Prescriptions     Pending Prescriptions Disp Refills    meloxicam (MOBIC) 15 MG tablet 90 tablet 1     Sig: Take 1 tablet by mouth Daily.        Pharmacy where request should be sent: [facility] - [place] [address] [facility] [phone] [facility] [phone] FX     Last office visit with prescribing clinician: 10/19/2022   Last telemedicine visit with prescribing clinician: Visit date not found   Next office visit with prescribing clinician: 8/23/2023     Additional details provided by patient:     Does the patient have less than a 3 day supply:  [] Yes  [] No    Would you like a call back once the refill request has been completed: [] Yes [] No    If the office needs to give you a call back, can they leave a voicemail: [] Yes [] No    Clari Gonzalez   07/28/23 08:03 EDT

## 2023-08-23 ENCOUNTER — OFFICE VISIT (OUTPATIENT)
Dept: INTERNAL MEDICINE | Facility: CLINIC | Age: 68
End: 2023-08-23
Payer: MEDICARE

## 2023-08-23 VITALS
SYSTOLIC BLOOD PRESSURE: 148 MMHG | HEIGHT: 73 IN | OXYGEN SATURATION: 95 % | DIASTOLIC BLOOD PRESSURE: 82 MMHG | HEART RATE: 67 BPM | BODY MASS INDEX: 28.71 KG/M2 | WEIGHT: 216.6 LBS | TEMPERATURE: 98 F

## 2023-08-23 DIAGNOSIS — M15.9 PRIMARY OSTEOARTHRITIS INVOLVING MULTIPLE JOINTS: ICD-10-CM

## 2023-08-23 DIAGNOSIS — E78.5 HYPERLIPIDEMIA LDL GOAL <100: ICD-10-CM

## 2023-08-23 DIAGNOSIS — Z00.00 ENCOUNTER FOR ANNUAL WELLNESS VISIT (AWV) IN MEDICARE PATIENT: Primary | ICD-10-CM

## 2023-08-23 PROBLEM — Z23 NEED FOR VACCINATION: Status: RESOLVED | Noted: 2019-04-30 | Resolved: 2023-08-23

## 2023-08-23 PROCEDURE — 1170F FXNL STATUS ASSESSED: CPT | Performed by: NURSE PRACTITIONER

## 2023-08-23 PROCEDURE — 96160 PT-FOCUSED HLTH RISK ASSMT: CPT | Performed by: NURSE PRACTITIONER

## 2023-08-23 PROCEDURE — 1159F MED LIST DOCD IN RCRD: CPT | Performed by: NURSE PRACTITIONER

## 2023-08-23 PROCEDURE — 1160F RVW MEDS BY RX/DR IN RCRD: CPT | Performed by: NURSE PRACTITIONER

## 2023-08-23 PROCEDURE — 93000 ELECTROCARDIOGRAM COMPLETE: CPT | Performed by: NURSE PRACTITIONER

## 2023-08-23 PROCEDURE — G0439 PPPS, SUBSEQ VISIT: HCPCS | Performed by: NURSE PRACTITIONER

## 2023-08-23 PROCEDURE — 99213 OFFICE O/P EST LOW 20 MIN: CPT | Performed by: NURSE PRACTITIONER

## 2023-08-23 NOTE — PROGRESS NOTES
The ABCs of the Annual Wellness Visit  Subsequent Medicare Wellness Visit    Subjective    Chad Marte is a 68 y.o. male who presents for a Subsequent Medicare Wellness Visit.    The following portions of the patient's history were reviewed and   updated as appropriate: allergies, current medications, past family history, past medical history, past social history, past surgical history, and problem list.    Compared to one year ago, the patient feels his physical   health is the same.    Compared to one year ago, the patient feels his mental   health is the same.    Recent Hospitalizations:  He was not admitted to the hospital during the last year.       Current Medical Providers:  Patient Care Team:  Rita Hawley APRN as PCP - Zuhair Young MD as Consulting Physician (Urology)  Clayton Keene MD as Surgeon (Orthopedic Surgery)    Outpatient Medications Prior to Visit   Medication Sig Dispense Refill    acetaminophen (Tylenol 8 Hour) 650 MG 8 hr tablet Take 1 tablet by mouth Every 8 (Eight) Hours As Needed for Mild Pain.      meloxicam (MOBIC) 15 MG tablet Take 1 tablet by mouth Daily. 90 tablet 1    Monovisc 88 MG/4ML solution prefilled syringe injection Inject 4 mL into the appropriate joint as directed by provider 1 (One) Time.      Multiple Vitamins-Minerals (multivitamin and minerals) liquid liquid Take  by mouth Daily.      Omega-3 Fatty Acids (fish oil) 1000 MG capsule capsule Take  by mouth Daily With Breakfast.      rosuvastatin (CRESTOR) 20 MG tablet TAKE 1 TABLET BY MOUTH EVERY  NIGHT 90 tablet 3    TURMERIC PO Take  by mouth.      Zn-Pyg Afri-Nettle-Saw Palmet (SAW PALMETTO COMPLEX PO) Take  by mouth.       No facility-administered medications prior to visit.       No opioid medication identified on active medication list. I have reviewed chart for other potential  high risk medication/s and harmful drug interactions in the elderly.        Aspirin is not on active medication list.   "Aspirin use is not indicated based on review of current medical condition/s. Risk of harm outweighs potential benefits.  .    Patient Active Problem List   Diagnosis    Primary osteoarthritis involving multiple joints    Hematuria    Proteinuria    H/O prostate biopsy    Primary osteoarthritis of left knee    Hyperlipidemia LDL goal <100    Encounter for annual wellness visit (AWV) in Medicare patient    Personal history of colonic polyps    Encounter for screening for malignant neoplasm of colon     Advance Care Planning   Advance Care Planning     Advance Directive is not on file.  ACP discussion was held with the patient during this visit. Patient has an advance directive (not in EMR), copy requested.     Objective    Vitals:    08/23/23 1004   BP: 148/82   BP Location: Left arm   Patient Position: Sitting   Cuff Size: Adult   Pulse: 67   Temp: 98 øF (36.7 øC)   TempSrc: Infrared   SpO2: 95%   Weight: 98.2 kg (216 lb 9.6 oz)   Height: 185.4 cm (73\")     Estimated body mass index is 28.58 kg/mý as calculated from the following:    Height as of this encounter: 185.4 cm (73\").    Weight as of this encounter: 98.2 kg (216 lb 9.6 oz).    BMI is >= 25 and <30. (Overweight) The following options were offered after discussion;: exercise counseling/recommendations      Does the patient have evidence of cognitive impairment? No    Lab Results   Component Value Date    CHLPL 184 08/16/2023    TRIG 54 08/16/2023    HDL 67 (H) 08/16/2023     (H) 08/16/2023    VLDL 10 08/16/2023        HEALTH RISK ASSESSMENT    Smoking Status:  Social History     Tobacco Use   Smoking Status Never    Passive exposure: Never   Smokeless Tobacco Never     Alcohol Consumption:  Social History     Substance and Sexual Activity   Alcohol Use Yes    Alcohol/week: 5.0 standard drinks    Types: 3 Glasses of wine, 2 Cans of beer per week     Fall Risk Screen:    SONIA Fall Risk Assessment was completed, and patient is at LOW risk for " falls.Assessment completed on:2023    Depression Screenin/23/2023    10:03 AM   PHQ-2/PHQ-9 Depression Screening   Little Interest or Pleasure in Doing Things 0-->not at all   Feeling Down, Depressed or Hopeless 0-->not at all   PHQ-9: Brief Depression Severity Measure Score 0       Health Habits and Functional and Cognitive Screenin/23/2023    10:01 AM   Functional & Cognitive Status   Do you have difficulty preparing food and eating? No   Do you have difficulty bathing yourself, getting dressed or grooming yourself? No   Do you have difficulty using the toilet? No   Do you have difficulty moving around from place to place? No   Do you have trouble with steps or getting out of a bed or a chair? No   Current Diet Well Balanced Diet   Dental Exam Up to date   Eye Exam Up to date   Exercise (times per week) 2 times per week   Current Exercises Include Yard Work;Home Fitness Gym   Do you need help using the phone?  No   Are you deaf or do you have serious difficulty hearing?  No   Do you need help to go to places out of walking distance? No   Do you need help shopping? No   Do you need help preparing meals?  No   Do you need help with housework?  No   Do you need help with laundry? No   Do you need help taking your medications? No   Do you need help managing money? No   Do you ever drive or ride in a car without wearing a seat belt? No   Have you felt unusual stress, anger or loneliness in the last month? No   Who do you live with? Spouse   If you need help, do you have trouble finding someone available to you? No   Do you have difficulty concentrating, remembering or making decisions? No       Age-appropriate Screening Schedule:  Refer to the list below for future screening recommendations based on patient's age, sex and/or medical conditions. Orders for these recommended tests are listed in the plan section. The patient has been provided with a written plan.    Health Maintenance   Topic Date  Due    ZOSTER VACCINE (2 of 3) 05/16/2014    COVID-19 Vaccine (4 - Pfizer series) 02/04/2022    ANNUAL WELLNESS VISIT  05/10/2022    INFLUENZA VACCINE  10/01/2023    LIPID PANEL  08/16/2024    COLORECTAL CANCER SCREENING  05/27/2026    TDAP/TD VACCINES (3 - Td or Tdap) 04/30/2029    HEPATITIS C SCREENING  Completed    Pneumococcal Vaccine 65+  Completed                  CMS Preventative Services Quick Reference  Risk Factors Identified During Encounter  Immunizations Discussed/Encouraged: Shingrix and COVID19  The above risks/problems have been discussed with the patient.  Pertinent information has been shared with the patient in the After Visit Summary.  An After Visit Summary and PPPS were made available to the patient.    Follow Up:   Next Medicare Wellness visit to be scheduled in 1 year.       Additional E&M Note during same encounter follows:  Patient has multiple medical problems which are significant and separately identifiable that require additional work above and beyond the Medicare Wellness Visit.      Chief Complaint  Medicare Wellness-subsequent    Subjective        HPI  Chad Marte is also being seen today for Hyperlipidemia and OA multiple sites. He was switched from Crestor to Lipitor due to muscle aches. He is tolerating this, and he is compliant with dose. He mann CP, SOA, edema. He has bought a home in [place] and has been very active.      He has OA pain for which he takes Mobic 7.5mg daily prn. He has intermittent daily pain and stiffness. He is followed by Dr. Keene, and has and has had gel injections. He uses Mobic sparingly.        Review of Systems   Constitutional: Negative.    HENT: Negative.     Eyes: Negative.    Respiratory: Negative.     Cardiovascular: Negative.  Negative for chest pain, palpitations and leg swelling.   Endocrine: Negative.    Genitourinary: Negative.    Musculoskeletal:  Positive for arthralgias.   Allergic/Immunologic: Negative.    Neurological: Negative.   "  Hematological: Negative.    Psychiatric/Behavioral: Negative.     All other systems reviewed and are negative.    Objective   Vital Signs:  /82 (BP Location: Left arm, Patient Position: Sitting, Cuff Size: Adult)   Pulse 67   Temp 98 øF (36.7 øC) (Infrared)   Ht 185.4 cm (73\")   Wt 98.2 kg (216 lb 9.6 oz)   SpO2 95%   BMI 28.58 kg/mý     Physical Exam  Vitals reviewed.   Constitutional:       Appearance: Normal appearance. He is not ill-appearing.   HENT:      Head: Normocephalic.      Right Ear: Tympanic membrane normal.      Left Ear: Tympanic membrane normal.   Cardiovascular:      Rate and Rhythm: Normal rate and regular rhythm.      Pulses: Normal pulses.      Heart sounds: Normal heart sounds. No murmur heard.  Pulmonary:      Effort: Pulmonary effort is normal. No respiratory distress.      Breath sounds: Normal breath sounds. No stridor.   Musculoskeletal:      Cervical back: Neck supple.      Right lower leg: No edema.      Left lower leg: No edema.   Neurological:      General: No focal deficit present.      Mental Status: He is alert and oriented to person, place, and time.   Psychiatric:         Mood and Affect: Mood normal.         Behavior: Behavior normal.         Thought Content: Thought content normal.        The following data was reviewed by: JOSSELYN Anderson on 08/23/2023:  CMP          10/17/2022    11:02 8/16/2023    10:22   CMP   Glucose 93  94    BUN 19  19    Creatinine 0.76  0.77    Sodium 141  142    Potassium 4.4  5.1    Chloride 105  105    Calcium 8.3  9.2    Total Protein 5.1  5.8    Albumin 3.40  3.9    Globulin 1.7  1.9    Total Bilirubin 0.6  0.8    Alkaline Phosphatase 48  55    AST (SGOT) 19  14    ALT (SGPT) 24  17    BUN/Creatinine Ratio 25.0  24.7      CBC          8/16/2023    10:22   CBC   WBC 5.19    RBC 4.99    Hemoglobin 14.9    Hematocrit 44.1    MCV 88.4    MCH 29.9    MCHC 33.8    RDW 12.8    Platelets 203      CBC w/diff          8/16/2023    10:22   CBC " w/Diff   WBC 5.19    RBC 4.99    Hemoglobin 14.9    Hematocrit 44.1    MCV 88.4    MCH 29.9    MCHC 33.8    RDW 12.8    Platelets 203    Neutrophil Rel % 62.6    Lymphocyte Rel % 26.6    Monocyte Rel % 7.3    Eosinophil Rel % 2.7    Basophil Rel % 0.4      Lipid Panel          10/17/2022    11:02 8/16/2023    10:22   Lipid Panel   Total Cholesterol 229  184    Triglycerides 88  54    HDL Cholesterol 76  67    VLDL Cholesterol 15  10    LDL Cholesterol  138  107        PSA          8/16/2023    10:22   PSA   PSA 0.636      Data reviewed : Cardiology studies ECG           Assessment and Plan      Diagnosis Plan   1. Encounter for annual wellness visit (AWV) in Medicare patient        2. Hyperlipidemia LDL goal <100  ECG 12 Lead    LDL improving on Crestor 20mg nightly      3. Primary osteoarthritis involving multiple joints      Mobic 15mg daily prn. Followed by ortho              I spent 45 minutes caring for Chad on this date of service. This time includes time spent by me in the following activities:preparing for the visit, reviewing tests, obtaining and/or reviewing a separately obtained history, performing a medically appropriate examination and/or evaluation , counseling and educating the patient/family/caregiver, ordering medications, tests, or procedures, documenting information in the medical record, and independently interpreting results and communicating that information with the patient/family/caregiver  Follow Up     6 months w labs    Patient was given instructions and counseling regarding his condition or for health maintenance advice. Please see specific information pulled into the AVS if appropriate.

## 2023-08-23 NOTE — PROGRESS NOTES
Procedure     ECG 12 Lead    Date/Time: 8/23/2023 10:18 AM  Performed by: Rita Hawley APRN  Authorized by: Rita Hawley APRN   Comparison: compared with previous ECG from 5/6/2020  Similar to previous ECG  Rhythm: sinus rhythm  Ectopy comments: None  Rate: normal  BPM: 62  Conduction: conduction normal  Conduction comments: MI 0.12 QRS 0.20  ST Segments: ST segments normal  QRS axis: normal    Clinical impression: normal ECG

## 2023-09-06 ENCOUNTER — OFFICE VISIT (OUTPATIENT)
Dept: ORTHOPEDIC SURGERY | Facility: CLINIC | Age: 68
End: 2023-09-06
Payer: MEDICARE

## 2023-09-06 VITALS — BODY MASS INDEX: 28.63 KG/M2 | HEIGHT: 73 IN | WEIGHT: 216 LBS

## 2023-09-06 DIAGNOSIS — M17.11 PRIMARY OSTEOARTHRITIS OF RIGHT KNEE: ICD-10-CM

## 2023-09-06 DIAGNOSIS — M17.12 PRIMARY OSTEOARTHRITIS OF LEFT KNEE: Primary | ICD-10-CM

## 2023-09-06 RX ORDER — TRIAMCINOLONE ACETONIDE 40 MG/ML
80 INJECTION, SUSPENSION INTRA-ARTICULAR; INTRAMUSCULAR
Status: COMPLETED | OUTPATIENT
Start: 2023-09-06 | End: 2023-09-06

## 2023-09-06 RX ORDER — LIDOCAINE HYDROCHLORIDE 10 MG/ML
4 INJECTION, SOLUTION EPIDURAL; INFILTRATION; INTRACAUDAL; PERINEURAL
Status: COMPLETED | OUTPATIENT
Start: 2023-09-06 | End: 2023-09-06

## 2023-09-06 RX ADMIN — LIDOCAINE HYDROCHLORIDE 4 ML: 10 INJECTION, SOLUTION EPIDURAL; INFILTRATION; INTRACAUDAL; PERINEURAL at 08:50

## 2023-09-06 RX ADMIN — TRIAMCINOLONE ACETONIDE 80 MG: 40 INJECTION, SUSPENSION INTRA-ARTICULAR; INTRAMUSCULAR at 08:50

## 2023-09-06 NOTE — PROGRESS NOTES
Subjective: Bilateral knee pain     Patient ID: Chad Marte is a 68 y.o. male.    Chief Complaint:    History of Present Illness 68-year-old male returns in follow-up regarding the viscous injection regarding his left knee back in mid July and increasing pain is now having in the right knee.  States the gel offered relief for maybe 4 to 5 weeks and is once again having disabling pain in the left knee and is now beginning to have pain in the right knee.       Social History     Occupational History    Not on file   Tobacco Use    Smoking status: Never     Passive exposure: Never    Smokeless tobacco: Never   Vaping Use    Vaping Use: Never used   Substance and Sexual Activity    Alcohol use: Yes     Alcohol/week: 5.0 standard drinks     Types: 3 Glasses of wine, 2 Cans of beer per week    Drug use: No    Sexual activity: Yes     Partners: Female     Birth control/protection: None, Vasectomy      Review of Systems   Constitutional:  Negative for chills, diaphoresis, fever and unexpected weight change.   HENT:  Negative for hearing loss, nosebleeds, sore throat and tinnitus.    Eyes:  Negative for pain and visual disturbance.   Respiratory:  Negative for cough, shortness of breath and wheezing.    Cardiovascular:  Negative for chest pain and palpitations.   Gastrointestinal:  Negative for abdominal pain, diarrhea, nausea and vomiting.   Endocrine: Negative for cold intolerance, heat intolerance and polydipsia.   Genitourinary:  Negative for difficulty urinating, dysuria and hematuria.   Musculoskeletal:  Positive for arthralgias and myalgias. Negative for joint swelling.   Skin:  Negative for rash and wound.   Allergic/Immunologic: Negative for environmental allergies.   Neurological:  Negative for dizziness, syncope and numbness.   Hematological:  Does not bruise/bleed easily.   Psychiatric/Behavioral:  Negative for dysphoric mood and sleep disturbance. The patient is not nervous/anxious.        Past Medical History:    Diagnosis Date    Arthritis     Fracture, humerus     Frozen shoulder     Hypercholesteremia     Knee swelling     Periarthritis of shoulder     Rotator cuff syndrome     Tear of meniscus of knee     Welcome to Medicare preventive visit 03/28/2016     Past Surgical History:   Procedure Laterality Date    COLONOSCOPY N/A 05/27/2016    Procedure: COLONOSCOPY / polypectomy;  Surgeon: Heath Naik MD;  Location:  [facility];  Service:     COLONOSCOPY W/ POLYPECTOMY N/A 06/11/2021    Procedure: COLONOSCOPY WITH POLYPECTOMY;  Surgeon: Heath Naik MD;  Location:  [facility] OR;  Service: Gastroenterology;  Laterality: N/A;  cecal polyp  ascending colon polyps x2  rectal polyp    FRACTURE SURGERY Right     R arm- plate    HAND SURGERY  1996    HERNIA REPAIR      TENDON TRANSFER Right     hand    TONSILLECTOMY       Family History   Problem Relation Age of Onset    Cancer Father          Objective:  There were no vitals filed for this visit.      09/06/23  0837   Weight: 98 kg (216 lb)     Body mass index is 28.5 kg/m².        Ortho Exam   he is alert and oriented x3.  The left knee shows no effusion with there is a boggy synovitis and there is pain and crepitus with range of motion but there is no instability in flexion extension noted varus or valgus 10 to 30 degrees.  Mild medial joint line tenderness negative Marie's and the calf is nontender.  The right knee shows no swelling effusion erythema is cool to touch with 0 to 125 degrees of motion with crepitus with range of motion but again no instability throughout the arc of motion in the calf is nontender.  He is taking meloxicam but not getting significant relief of his symptoms.    Assessment:        1. Primary osteoarthritis of left knee    2. Primary osteoarthritis of right knee           Plan: I reviewed his x-rays regarding the left knee and the right knee.  The left knee shows moderate osteoarthritis with near bone-on-bone in the medial  compartment.  As far as treatment moving forward he is failed cortisone and gel but at this point the patient is not ready to proceed with total knee replacement.  Did discuss that if we do a cortisone injection today to try to get him some relief he cannot have a total knee for least 3 months and he understands and wants to proceed therefore the left knee was injected through the superolateral portal 4 cc of lidocaine and 2 cc of Kenalog.  With regard to the right knee which is failed cortisone in the past I am going to request authorization for the gel injection and the return to undergo that injection once approval has been obtained.  When he does return get an updated AP lateral sunrise of the right knee.  Patient was in agreement.  Answered all questions  Large Joint Arthrocentesis: L knee  Date/Time: 9/6/2023 8:50 AM  Consent given by: patient  Site marked: site marked  Timeout: Immediately prior to procedure a time out was called to verify the correct patient, procedure, equipment, support staff and site/side marked as required   Supporting Documentation  Indications: pain   Procedure Details  Location: knee - L knee  Preparation: Patient was prepped and draped in the usual sterile fashion  Needle size: 22 G  Approach: superior  Medications administered: 80 mg triamcinolone acetonide 40 MG/ML; 4 mL lidocaine PF 1% 1 %  Patient tolerance: patient tolerated the procedure well with no immediate complications                Work Status:    SANDHYA query complete.    Orders:  Orders Placed This Encounter   Procedures    Large Joint Arthrocentesis: L knee    Visco Treatment       Medications:  No orders of the defined types were placed in this encounter.      Followup:  Return in about 3 weeks (around 9/27/2023).          Dictated utilizing Dragon dictation

## 2023-09-21 ENCOUNTER — CLINICAL SUPPORT (OUTPATIENT)
Dept: ORTHOPEDIC SURGERY | Facility: CLINIC | Age: 68
End: 2023-09-21
Payer: MEDICARE

## 2023-09-21 VITALS — WEIGHT: 216 LBS | HEIGHT: 73 IN | BODY MASS INDEX: 28.63 KG/M2

## 2023-09-21 DIAGNOSIS — M17.12 PRIMARY OSTEOARTHRITIS OF LEFT KNEE: Primary | ICD-10-CM

## 2023-09-21 DIAGNOSIS — M17.11 PRIMARY OSTEOARTHRITIS OF RIGHT KNEE: ICD-10-CM

## 2023-09-21 NOTE — PROGRESS NOTES
Subjective: Osteoarthritis left knee     Patient ID: Chad Marte is a 68 y.o. male.    Chief Complaint:    History of Present Illness patient seen for Monovisc injection into the left knee       Social History     Occupational History    Not on file   Tobacco Use    Smoking status: Never     Passive exposure: Never    Smokeless tobacco: Never   Vaping Use    Vaping Use: Never used   Substance and Sexual Activity    Alcohol use: Yes     Alcohol/week: 5.0 standard drinks     Types: 3 Glasses of wine, 2 Cans of beer per week    Drug use: No    Sexual activity: Yes     Partners: Female     Birth control/protection: None, Vasectomy      Review of Systems      Past Medical History:   Diagnosis Date    Arthritis     Fracture, humerus     Frozen shoulder     Hypercholesteremia     Knee swelling     Periarthritis of shoulder     Rotator cuff syndrome     Tear of meniscus of knee     Welcome to Medicare preventive visit 03/28/2016     Past Surgical History:   Procedure Laterality Date    COLONOSCOPY N/A 05/27/2016    Procedure: COLONOSCOPY / polypectomy;  Surgeon: Heath Naik MD;  Location: [facility] OR;  Service:     COLONOSCOPY W/ POLYPECTOMY N/A 06/11/2021    Procedure: COLONOSCOPY WITH POLYPECTOMY;  Surgeon: Heath Naik MD;  Location: [facility] OR;  Service: Gastroenterology;  Laterality: N/A;  cecal polyp  ascending colon polyps x2  rectal polyp    FRACTURE SURGERY Right     R arm- plate    HAND SURGERY  1996    HERNIA REPAIR      TENDON TRANSFER Right     hand    TONSILLECTOMY       Family History   Problem Relation Age of Onset    Cancer Father          Objective:  There were no vitals filed for this visit.      09/21/23  0845   Weight: 98 kg (216 lb)     Body mass index is 28.5 kg/m².        Ortho Exam   he is alert and oriented x3.  The left knee after sterile prepping underwent injection of 6 Monovisc gel.  Postinjection instructions given.    Assessment:        1. Primary osteoarthritis of  left knee    2. Primary osteoarthritis of right knee           Plan: Patient return to see me as needed regarding the left knee.  Did inquire about getting his right knee replaced and wants to do it sometime in December.  I gave Dr. Engle's name to call back and request an appointment Dr. Engle when he is ready to schedule his total knee.  Answered all questions      Large Joint Arthrocentesis: R knee  Date/Time: 9/21/2023 9:02 AM  Consent given by: patient  Site marked: site marked  Timeout: Immediately prior to procedure a time out was called to verify the correct patient, procedure, equipment, support staff and site/side marked as required   Supporting Documentation  Indications: pain   Procedure Details  Location: knee - R knee  Preparation: Patient was prepped and draped in the usual sterile fashion  Needle size: 20 G  Approach: superior  Medications administered: 88 mg Hyaluronan 88 MG/4ML  Patient tolerance: patient tolerated the procedure well with no immediate complications          Work Status:    SANDHYA query complete.    Orders:  Orders Placed This Encounter   Procedures    Large Joint Arthrocentesis: R knee    Large Joint Arthrocentesis: R knee       Medications:  No orders of the defined types were placed in this encounter.      Followup:  Return if symptoms worsen or fail to improve.          Dictated utilizing Dragon dictation

## 2023-09-21 NOTE — PROGRESS NOTES
Calvin Marte is a 68 y.o. male.     History of Present Illness     {Common H&P Review Areas:12763}    Review of Systems    Objective   Physical Exam      Assessment & Plan   {Assess/PlanSmartLinks:76685}

## 2023-09-26 ENCOUNTER — TELEPHONE (OUTPATIENT)
Dept: ORTHOPEDIC SURGERY | Facility: CLINIC | Age: 68
End: 2023-09-26
Payer: MEDICARE

## 2023-09-26 NOTE — TELEPHONE ENCOUNTER
Provider: DR HERNANDEZ     Caller: CYRUS ROBERTS     Relationship to Patient: SPOUSE     Phone Number: [phone]      Reason for Call: PATIENT WAS IN LAST WEEK AND WAS TOLD THAT IF [employer] DIDN'T SETTLE TO CALL IN AND ASK A FOR A LIST OF DOCTORS THAT DR HERNANDEZ SUGGEST SPECIFICALLY FOR THE KNEE.     SPOUSE FOUND [place] HIP AND KNEE A DR GREEN AND WANTED TO KNOW IF THAT WAS A GOOD PLACE     SPOUSE REQUESTING A CALL BACK  PLEASE ADVISE    Bactrim Pregnancy And Lactation Text: This medication is Pregnancy Category D and is known to cause fetal risk.  It is also excreted in breast milk.

## 2024-01-15 ENCOUNTER — HOSPITAL ENCOUNTER (OUTPATIENT)
Dept: PHYSICAL THERAPY | Facility: HOSPITAL | Age: 69
Setting detail: THERAPIES SERIES
Discharge: HOME | End: 2024-01-15
Payer: MEDICARE

## 2024-01-15 DIAGNOSIS — Z96.652 HISTORY OF TOTAL KNEE REPLACEMENT, LEFT: Primary | ICD-10-CM

## 2024-01-15 PROCEDURE — 97161 PT EVAL LOW COMPLEX 20 MIN: CPT | Performed by: PHYSICAL THERAPIST

## 2024-01-15 NOTE — THERAPY EVALUATION
Outpatient Physical Therapy Ortho Initial Evaluation   Lucy Knapp     Patient Name: Chad Marte  : 1955  MRN: [id number]  Today's Date: 1/15/2024      Visit Date: 01/15/2024    Patient Active Problem List   Diagnosis    Primary osteoarthritis involving multiple joints    Hematuria    Proteinuria    H/O prostate biopsy    Primary osteoarthritis of left knee    Hyperlipidemia LDL goal <100    Encounter for annual wellness visit (AWV) in Medicare patient    Personal history of colonic polyps    Encounter for screening for malignant neoplasm of colon        Past Medical History:   Diagnosis Date    Arthritis     Fracture, humerus     Frozen shoulder     Hypercholesteremia     Knee swelling     Periarthritis of shoulder     Rotator cuff syndrome     Tear of meniscus of knee     Welcome to Medicare preventive visit 2016        Past Surgical History:   Procedure Laterality Date    COLONOSCOPY N/A 2016    Procedure: COLONOSCOPY / polypectomy;  Surgeon: Heath Naik MD;  Location:  [facility] OR;  Service:     COLONOSCOPY W/ POLYPECTOMY N/A 2021    Procedure: COLONOSCOPY WITH POLYPECTOMY;  Surgeon: Heath Naik MD;  Location: [facility] OR;  Service: Gastroenterology;  Laterality: N/A;  cecal polyp  ascending colon polyps x2  rectal polyp    FRACTURE SURGERY Right     R arm- plate    HAND SURGERY      HERNIA REPAIR      TENDON TRANSFER Right     hand    TONSILLECTOMY         Visit Dx:     ICD-10-CM ICD-9-CM   1. History of total knee replacement, left  Z96.652 V43.65          Patient History       Row Name 01/15/24 0950             History    Chief Complaint Difficulty Walking;Difficulty with daily activities;Pain;Swelling;Muscle weakness  -      Type of Pain Knee pain  -      Date Current Problem(s) Began 23  -      Brief Description of Current Complaint Pt reports an almost 5 year history of left knee pain. He has had multiple corticone shots and gel  injections. He continued to have pain. HE underwent a left TKA on 12/22/2023. He was discharged home that same day and received home health therapy until 1/12/2024. He is now referred for outpatient therapy.  -GC      Patient/Caregiver Goals Return to prior level of function;Improve mobility;Improve strength  -GC      Hand Dominance right-handed  -GC      Occupation/sports/leisure activities Hunting fishing bicycle walking  -GC      Patient seeing anyone else for problem(s)? No  -GC      What clinical tests have you had for this problem? Other 1 (comment);Other 2 (comment)  -GC         Pain     Pain Location Knee  left  -GC      Pain at Worst 1  -GC      Pain Frequency Intermittent  -GC      Pain Description Aching;Discomfort;Sore  -GC      What Performance Factors Make the Current Problem(s) WORSE? Pt c/o pain the longer he is on his feet  -GC      Difficulties with ADL's? Pt has pain the longer he is on jhis feet  -GC         Fall Risk Assessment    Any falls in the past year: No  -GC         Services    Prior Rehab/Home Health Experiences Yes  -GC      Are you currently receiving Home Health services Yes  -GC      Do you plan to receive Home Health services in the near future No  -GC         Daily Activities    Primary Language English  -GC      Are you able to read Yes  -GC      Are you able to write Yes  -GC      How does patient learn best? Demonstration  -GC      Teaching needs identified Home Exercise Program;Management of Condition  -GC      Patient is concerned about/has problems with Performing home management (household chores, shopping, care of dependents);Performing job responsibilities/community activities (work, school,;Flexibility;Walking  -GC      Does patient have problems with the following? None  -GC      Barriers to learning None  -GC      Functional Status mobility issues preventing performance of daily activities  -GC      Pt Participated in POC and Goals Yes  -GC         Safety    Are you  being hurt, hit, or frightened by anyone at home or in your life? No  -GC      Are you being neglected by a caregiver No  -GC      Have you had any of the following issues with N/A  -GC                User Key  (r) = Recorded By, (t) = Taken By, (c) = Cosigned By      Initials Name Provider Type    GC Stephon Maradiaga, PT Physical Therapist                     PT Ortho       Row Name 01/15/24 0907       Posture/Observations    Posture/Observations Comments Pt has moderate edema left knee with mild quad atrophy noted. The surgical site is nicely healed.  -GC       Knee Palpation    Medial Joint Line Left:;Tender  -GC    Lateral Joint Line Left:;Tender  -GC       Patellar Accessory Motions    Superior glide Left:;WNL  -GC    Inferior glide Left:;WNL  -GC    Medial glide Left:;WNL  -GC    Lateral glide Left:;WNL  -GC       Knee Special Tests    Ayana’s sign (DVT) Left:;Negative  -GC       Left Lower Ext    Lt Knee Extension/Flexion AROM 0-6-110 degrees  -GC       MMT Left Lower Ext    Lt Hip Flexion MMT, Gross Movement (4/5) good  -GC    Lt Hip Extension MMT, Gross Movement (4+/5) good plus  -GC    Lt Hip ABduction MMT, Gross Movement (4+/5) good plus  -GC    Lt Hip ADduction MMT, Gross Movement (4/5) good  -GC    Lt Knee Extension MMT, Gross Movement (4/5) good  -GC    Lt Knee Flexion MMT, Gross Movement (4+/5) good plus  -GC    Lt Ankle Plantarflexion MMT, Gross Movement (5/5) normal  -GC    Lt Ankle Dorsiflexion MMT, Gross Movement (5/5) normal  -GC       Sensation    Light Touch No apparent deficits  -GC       Transfers    Comment, (Transfers) Pt is independent with all bed mobility and transfers  -       Gait/Stairs (Locomotion)    Comment, (Gait/Stairs) Pt ambulates with mild antalgic gait without using an assistive device  -GC              User Key  (r) = Recorded By, (t) = Taken By, (c) = Cosigned By      Initials Name Provider Type    GC Stephon Maradiaga PT Physical Therapist                                 Therapy Education  Given: HEP, Symptoms/condition management, Pain management  How Provided: Verbal, Demonstration, Written  Provided to: Patient  Level of Understanding: Teach back education performed, Verbalized, Demonstrated      PT OP Goals       Row Name 01/15/24 0950          PT Short Term Goals    STG Date to Achieve 02/05/24  -     STG 1 Increase left knee AROM to 0-120 degrees with testing.  -GC     STG 2 Increase left LE strength to at least 4+/5 all planes with testing.  -     STG 3 Pt will be independent with his HEP issued by this therapist.  -GC        Long Term Goals    LTG Date to Achieve 02/26/24  -GC     LTG 1 Increase left knee AROM to 0-130 degrees with testing.  -GC     LTG 2 Increase left LE strength to 5/5 all planes with testing.  -     LTG 3 Pt will ambulate normally on levels and stairs without assistive device.  -     LTG 4 Pt will be independent with all ADLs and have a LEFS score > 70.  -        Time Calculation    PT Goal Re-Cert Due Date 02/12/24  -               User Key  (r) = Recorded By, (t) = Taken By, (c) = Cosigned By      Initials Name Provider Type     Stephon Maradiaga, PT Physical Therapist                     PT Assessment/Plan       Row Name 01/15/24 0950          PT Assessment    Functional Limitations Impaired gait;Limitation in home management;Limitations in community activities;Limitations in functional capacity and performance;Performance in leisure activities  -     Impairments Range of motion;Pain;Muscle strength;Impaired flexibility;Gait;Edema  -     Assessment Comments Pt presents approximately 3 weeks s/p left TKA. He has very little pain rating it only as a 1/10 with activity. He has decreased left knee ROM, decreased left LE strength, decreasd ambulatory status, and decreased function secondary to the above.  -     Rehab Potential Good  -     Patient/caregiver participated in establishment of treatment plan and goals Yes   -GC     Patient would benefit from skilled therapy intervention Yes  -GC        PT Plan    PT Frequency 2x/week;3x/week  -GC     Predicted Duration of Therapy Intervention (PT) 6 weeks  -GC     Planned CPT's? PT EVAL LOW COMPLEXITY: 37415;PT THER PROC EA 15 MIN: 46761;PT MANUAL THERAPY EA 15 MIN: 14806  -GC     PT Plan Comments Pt is to continue his HEP 2x daily  -GC               User Key  (r) = Recorded By, (t) = Taken By, (c) = Cosigned By      Initials Name Provider Type    GC Stephon Maradiaga, PT Physical Therapist                       OP Exercises       Row Name 01/15/24 0950             Exercise 1    Exercise Name 1 Heel slides  -GC      Time 1 8 min  -GC         Exercise 2    Exercise Name 2 wall slides  -GC      Time 2 8 min  -GC         Exercise 3    Exercise Name 3 Passive knee FLEX stretch  -GC      Reps 3 10  -GC      Time 3 10 secs  -GC         Exercise 4    Exercise Name 4 Hamstring stretch  -GC      Reps 4 15  -GC      Time 4 10 secs  -GC         Exercise 5    Exercise Name 5 Heel prop  -GC      Time 5 5 min  -GC         Exercise 6    Exercise Name 6 passive knee EXT stretch  -GC      Reps 6 10  -GC      Time 6 10 secs  -GC         Exercise 7    Exercise Name 7 QS  -GC      Reps 7 25  -GC      Time 7 5 secs  -GC         Exercise 8    Exercise Name 8 SLR  -GC      Reps 8 25  -GC         Exercise 9    Exercise Name 9 Hip ABD  -GC      Reps 9 25  -GC         Exercise 10    Exercise Name 10 SAQ  -GC      Reps 10 25  -GC         Exercise 11    Exercise Name 11 LAQ  -GC      Reps 11 25  -GC                User Key  (r) = Recorded By, (t) = Taken By, (c) = Cosigned By      Initials Name Provider Type    GC Stephon Maradiaga, PT Physical Therapist                                  Outcome Measure Options: Lower Extremity Functional Scale (LEFS)  Lower Extremity Functional Index  Any of your usual work, housework or school activities: A little bit of difficulty  Your usual hobbies, recreational or sporting  activities: A little bit of difficulty  Getting into or out of the bath: No difficulty  Walking between rooms: No difficulty  Putting on your shoes or socks: No difficulty  Squatting: No difficulty  Lifting an object, like a bag of groceries from the floor: No difficulty  Performing light activities around your home: No difficulty  Performing heavy activities around your home: A little bit of difficulty  Getting into or out of a car: No difficulty  Walking 2 blocks: A little bit of difficulty  Walking a mile: Quite a bit of difficulty  Going up or down 10 stairs (about 1 flight of stairs): A little bit of difficulty  Standing for 1 hour: A little bit of difficulty  Sitting for 1 hour: No difficulty  Rolling over in bed: No difficulty  Lower Extremity Functional Index  Any of your usual work, housework or school activities: A little bit of difficulty  Your usual hobbies, recreational or sporting activities: A little bit of difficulty  Getting into or out of the bath: No difficulty  Walking between rooms: No difficulty  Putting on your shoes or socks: No difficulty  Squatting: No difficulty  Lifting an object, like a bag of groceries from the floor: No difficulty  Performing light activities around your home: No difficulty  Performing heavy activities around your home: A little bit of difficulty  Getting into or out of a car: No difficulty  Walking 2 blocks: A little bit of difficulty  Walking a mile: Quite a bit of difficulty  Going up or down 10 stairs (about 1 flight of stairs): A little bit of difficulty  Standing for 1 hour: A little bit of difficulty  Sitting for 1 hour: No difficulty  Rolling over in bed: No difficulty      Time Calculation:     Start Time: 0950  Stop Time: 1052  Time Calculation (min): 62 min     Therapy Charges for Today       Code Description Service Date Service Provider Modifiers Qty    [id number] HC PT EVAL LOW COMPLEXITY 3 1/15/2024 Stephon Maradiaga, PT GP 1            PT G-Codes  Outcome  Measure Options: Lower Extremity Functional Scale (LEFS)         Stephon Maradiaga, PT  1/15/2024

## 2024-01-18 ENCOUNTER — HOSPITAL ENCOUNTER (OUTPATIENT)
Dept: PHYSICAL THERAPY | Facility: HOSPITAL | Age: 69
Setting detail: THERAPIES SERIES
Discharge: HOME | End: 2024-01-18
Payer: MEDICARE

## 2024-01-18 DIAGNOSIS — Z96.652 HISTORY OF TOTAL KNEE REPLACEMENT, LEFT: Primary | ICD-10-CM

## 2024-01-18 PROCEDURE — 97110 THERAPEUTIC EXERCISES: CPT | Performed by: PHYSICAL THERAPIST

## 2024-01-22 ENCOUNTER — HOSPITAL ENCOUNTER (OUTPATIENT)
Dept: PHYSICAL THERAPY | Facility: HOSPITAL | Age: 69
Setting detail: THERAPIES SERIES
Discharge: HOME | End: 2024-01-22
Payer: MEDICARE

## 2024-01-22 DIAGNOSIS — Z96.652 HISTORY OF TOTAL KNEE REPLACEMENT, LEFT: Primary | ICD-10-CM

## 2024-01-22 PROCEDURE — 97110 THERAPEUTIC EXERCISES: CPT | Performed by: PHYSICAL THERAPIST

## 2024-01-22 NOTE — THERAPY TREATMENT NOTE
Outpatient Physical Therapy Ortho Treatment Note   Lucy Knapp     Patient Name: Chad Marte  : 1955  MRN: [id number]  Today's Date: 2024      Visit Date: 2024    Visit Dx:    ICD-10-CM ICD-9-CM   1. History of total knee replacement, left  Z96.652 V43.65       Patient Active Problem List   Diagnosis    Primary osteoarthritis involving multiple joints    Hematuria    Proteinuria    H/O prostate biopsy    Primary osteoarthritis of left knee    Hyperlipidemia LDL goal <100    Encounter for annual wellness visit (AWV) in Medicare patient    Personal history of colonic polyps    Encounter for screening for malignant neoplasm of colon        Past Medical History:   Diagnosis Date    Arthritis     Fracture, humerus     Frozen shoulder     Hypercholesteremia     Knee swelling     Periarthritis of shoulder     Rotator cuff syndrome     Tear of meniscus of knee     Welcome to Medicare preventive visit 2016        Past Surgical History:   Procedure Laterality Date    COLONOSCOPY N/A 2016    Procedure: COLONOSCOPY / polypectomy;  Surgeon: Heath Naik MD;  Location:  [facility];  Service:     COLONOSCOPY W/ POLYPECTOMY N/A 2021    Procedure: COLONOSCOPY WITH POLYPECTOMY;  Surgeon: Heath Naik MD;  Location:  [facility];  Service: Gastroenterology;  Laterality: N/A;  cecal polyp  ascending colon polyps x2  rectal polyp    FRACTURE SURGERY Right     R arm- plate    HAND SURGERY      HERNIA REPAIR      TENDON TRANSFER Right     hand    TONSILLECTOMY          PT Ortho       Row Name 24 8030       Left Lower Ext    Lt Knee Extension/Flexion AROM 0-8-124 degrees prior to treatment and 0-4-134 degrees after stretching  -              User Key  (r) = Recorded By, (t) = Taken By, (c) = Cosigned By      Initials Name Provider Type    Stephon Ramos, PT Physical Therapist                                 PT Assessment/Plan       Row Name 24 0882           "PT Assessment    Assessment Comments Pt is doing well with increased knee ROM and improving function.  -GC        PT Plan    PT Plan Comments Pt is to continue his HEP daily. Has MD follow up 2/5.  -GC               User Key  (r) = Recorded By, (t) = Taken By, (c) = Cosigned By      Initials Name Provider Type    GC Stephon Maradiaga, PT Physical Therapist                       OP Exercises       Row Name 01/22/24 0830             Subjective    Subjective Comments Pt states his knee is doing well.  -GC         Exercise 1    Exercise Name 1 Heel slides  -GC      Time 1 8 min  -GC         Exercise 2    Exercise Name 2 wall slides  -GC      Time 2 8 min  -GC         Exercise 3    Exercise Name 3 Passive knee FLEX stretch  -GC      Reps 3 10  -GC      Time 3 10 secs  -GC         Exercise 4    Exercise Name 4 Hamstring stretch  -GC      Reps 4 15  -GC      Time 4 10 secs  -GC         Exercise 5    Exercise Name 5 prone leg hang  -GC      Time 5 5 min  -GC      Additional Comments 3#  -GC         Exercise 6    Exercise Name 6 passive knee EXT stretch  -GC      Reps 6 10  -GC      Time 6 10 secs  -GC         Exercise 7    Exercise Name 7 QS  -GC      Reps 7 25  -GC      Time 7 5 secs  -GC         Exercise 8    Exercise Name 8 SLR  -GC      Reps 8 25  -GC      Time 8 3#  -GC         Exercise 9    Exercise Name 9 Hip ABD  -GC      Reps 9 25  -GC      Time 9 3#  -GC         Exercise 10    Exercise Name 10 SAQ  -GC      Reps 10 25  -GC      Time 10 3#  -GC         Exercise 11    Exercise Name 11 LAQ  -GC      Reps 11 25  -GC      Time 11 3#  -GC         Exercise 12    Exercise Name 12 prone hip EXT  -GC      Reps 12 25  -GC      Time 12 3#  -GC         Exercise 13    Exercise Name 13 Heel raises  -GC      Reps 13 25  -GC         Exercise 14    Exercise Name 14 Partial squats  -GC      Reps 14 25  -GC         Exercise 15    Exercise Name 15 Froward step ups on 6\" step  -GC      Reps 15 20x ea  -GC                User Key  (r) = " Recorded By, (t) = Taken By, (c) = Cosigned By      Initials Name Provider Type     Stephon Maradiaga, PT Physical Therapist                                                    Time Calculation:   Start Time: 0830  Stop Time: 0929  Time Calculation (min): 59 min  Therapy Charges for Today       Code Description Service Date Service Provider Modifiers Qty    [id number]  PT THER PROC EA 15 MIN 1/22/2024 Stephon Maradiaga, PT GP 2                      Stephon Maradiaga PT  1/22/2024

## 2024-01-25 ENCOUNTER — HOSPITAL ENCOUNTER (OUTPATIENT)
Dept: PHYSICAL THERAPY | Facility: HOSPITAL | Age: 69
Setting detail: THERAPIES SERIES
Discharge: HOME | End: 2024-01-25
Payer: MEDICARE

## 2024-01-25 DIAGNOSIS — Z96.652 HISTORY OF TOTAL KNEE REPLACEMENT, LEFT: Primary | ICD-10-CM

## 2024-01-25 PROCEDURE — 97110 THERAPEUTIC EXERCISES: CPT | Performed by: PHYSICAL THERAPIST

## 2024-01-25 NOTE — THERAPY TREATMENT NOTE
Outpatient Physical Therapy Ortho Treatment Note   Lucy Knapp     Patient Name: Chad Marte  : 1955  MRN: [id number]  Today's Date: 2024      Visit Date: 2024    Visit Dx:    ICD-10-CM ICD-9-CM   1. History of total knee replacement, left  Z96.652 V43.65       Patient Active Problem List   Diagnosis    Primary osteoarthritis involving multiple joints    Hematuria    Proteinuria    H/O prostate biopsy    Primary osteoarthritis of left knee    Hyperlipidemia LDL goal <100    Encounter for annual wellness visit (AWV) in Medicare patient    Personal history of colonic polyps    Encounter for screening for malignant neoplasm of colon        Past Medical History:   Diagnosis Date    Arthritis     Fracture, humerus     Frozen shoulder     Hypercholesteremia     Knee swelling     Periarthritis of shoulder     Rotator cuff syndrome     Tear of meniscus of knee     Welcome to Medicare preventive visit 2016        Past Surgical History:   Procedure Laterality Date    COLONOSCOPY N/A 2016    Procedure: COLONOSCOPY / polypectomy;  Surgeon: Heath Naik MD;  Location:  [facility];  Service:     COLONOSCOPY W/ POLYPECTOMY N/A 2021    Procedure: COLONOSCOPY WITH POLYPECTOMY;  Surgeon: Heath Naik MD;  Location:  [facility];  Service: Gastroenterology;  Laterality: N/A;  cecal polyp  ascending colon polyps x2  rectal polyp    FRACTURE SURGERY Right     R arm- plate    HAND SURGERY      HERNIA REPAIR      TENDON TRANSFER Right     hand    TONSILLECTOMY          PT Ortho       Row Name 24 0855       Subjective    Subjective Comments Pt states his knee is feeling pretty good.  -GC       Left Lower Ext    Lt Knee Extension/Flexion AROM 0-2-132 degrees after stretching  -GC              User Key  (r) = Recorded By, (t) = Taken By, (c) = Cosigned By      Initials Name Provider Type    Stephon Ramos, PT Physical Therapist                                 PT  "Assessment/Plan       Row Name 01/25/24 0800          PT Assessment    Assessment Comments Pt is doing well with increased knee ROM and improving function.  -GC        PT Plan    PT Plan Comments Pt is to continue his HEP 1-2x daily.  -GC               User Key  (r) = Recorded By, (t) = Taken By, (c) = Cosigned By      Initials Name Provider Type    GC Stephon Maradiaga, PT Physical Therapist                       OP Exercises       Row Name 01/25/24 0828             Subjective    Subjective Comments Pt states his knee is feeling pretty good.  -GC         Exercise 1    Exercise Name 1 Heel slides  -GC      Time 1 8 min  -GC         Exercise 2    Exercise Name 2 wall slides  -GC      Time 2 8 min  -GC         Exercise 3    Exercise Name 3 Passive knee FLEX stretch  -GC      Reps 3 10  -GC      Time 3 10 secs  -GC         Exercise 4    Exercise Name 4 Hamstring stretch  -GC      Reps 4 15  -GC      Time 4 10 secs  -GC         Exercise 5    Exercise Name 5 prone leg hang  -GC      Time 5 5 min  -GC         Exercise 6    Exercise Name 6 passive knee EXT stretch  -GC      Reps 6 10  -GC      Time 6 10 secs  -GC         Exercise 7    Exercise Name 7 QS  -GC      Reps 7 25  -GC      Time 7 5 secs  -GC         Exercise 8    Exercise Name 8 SLR  -GC      Reps 8 25  -GC      Time 8 3#  -GC         Exercise 9    Exercise Name 9 Hip ABD  -GC      Reps 9 25  -GC      Time 9 3#  -GC         Exercise 10    Exercise Name 10 SAQ  -GC      Reps 10 25  -GC      Time 10 3#  -GC         Exercise 11    Exercise Name 11 LAQ  -GC      Reps 11 25  -GC      Time 11 3#  -GC         Exercise 12    Exercise Name 12 prone hip EXT  -GC      Reps 12 25  -GC      Time 12 3#  -GC         Exercise 13    Exercise Name 13 Heel raises  -GC      Reps 13 25  -GC         Exercise 14    Exercise Name 14 Partial squats  -GC      Reps 14 25  -GC         Exercise 15    Exercise Name 15 Froward step ups on 6\" step  -GC      Reps 15 20x ea  -GC         Exercise 16 " "   Exercise Name 16 Laateral step overs on 6\" step  -GC      Reps 16 20x  -GC                User Key  (r) = Recorded By, (t) = Taken By, (c) = Cosigned By      Initials Name Provider Type    GC Stephon Maradiaga, PT Physical Therapist                                                    Time Calculation:   Start Time: 0830  Stop Time: 0948  Time Calculation (min): 78 min  Therapy Charges for Today       Code Description Service Date Service Provider Modifiers Qty    46811338709  PT THER PROC EA 15 MIN 1/25/2024 Stephon Maradiaga, PT GP 2                      Stephon Maradiaga, PT  1/25/2024     "

## 2024-01-30 ENCOUNTER — HOSPITAL ENCOUNTER (OUTPATIENT)
Dept: PHYSICAL THERAPY | Facility: HOSPITAL | Age: 69
Setting detail: THERAPIES SERIES
Discharge: HOME | End: 2024-01-30
Payer: MEDICARE

## 2024-01-30 DIAGNOSIS — Z96.652 HISTORY OF TOTAL KNEE REPLACEMENT, LEFT: Primary | ICD-10-CM

## 2024-01-30 PROCEDURE — 97110 THERAPEUTIC EXERCISES: CPT | Performed by: PHYSICAL THERAPIST

## 2024-01-30 NOTE — THERAPY TREATMENT NOTE
Outpatient Physical Therapy Ortho Treatment Note   Lucy Knapp     Patient Name: Chad Marte  : 1955  MRN: [id number]  Today's Date: 2024      Visit Date: 2024    Visit Dx:    ICD-10-CM ICD-9-CM   1. History of total knee replacement, left  Z96.652 V43.65       Patient Active Problem List   Diagnosis    Primary osteoarthritis involving multiple joints    Hematuria    Proteinuria    H/O prostate biopsy    Primary osteoarthritis of left knee    Hyperlipidemia LDL goal <100    Encounter for annual wellness visit (AWV) in Medicare patient    Personal history of colonic polyps    Encounter for screening for malignant neoplasm of colon        Past Medical History:   Diagnosis Date    Arthritis     Fracture, humerus     Frozen shoulder     Hypercholesteremia     Knee swelling     Periarthritis of shoulder     Rotator cuff syndrome     Tear of meniscus of knee     Welcome to Medicare preventive visit 2016        Past Surgical History:   Procedure Laterality Date    COLONOSCOPY N/A 2016    Procedure: COLONOSCOPY / polypectomy;  Surgeon: Heath Naik MD;  Location:  [facility];  Service:     COLONOSCOPY W/ POLYPECTOMY N/A 2021    Procedure: COLONOSCOPY WITH POLYPECTOMY;  Surgeon: Heath Naik MD;  Location:  [facility];  Service: Gastroenterology;  Laterality: N/A;  cecal polyp  ascending colon polyps x2  rectal polyp    FRACTURE SURGERY Right     R arm- plate    HAND SURGERY      HERNIA REPAIR      TENDON TRANSFER Right     hand    TONSILLECTOMY          PT Ortho       Row Name 24 0720       Subjective    Subjective Comments Pt states his knee is a little stiff this morning.  -GC       Left Lower Ext    Lt Knee Extension/Flexion AROM 0-133 degrees after stretching  -GC              User Key  (r) = Recorded By, (t) = Taken By, (c) = Cosigned By      Initials Name Provider Type    Stephon Ramos, PT Physical Therapist                                 PT  Assessment/Plan       Row Name 01/30/24 0720          PT Assessment    Assessment Comments Pt is doing well with increased knee ROM and good tolerance to his exercise progression.  -GC        PT Plan    PT Plan Comments Pt is to continue his HEP daily.  -GC               User Key  (r) = Recorded By, (t) = Taken By, (c) = Cosigned By      Initials Name Provider Type    GC Stephon Maradiaga, PT Physical Therapist                       OP Exercises       Row Name 01/30/24 0720             Subjective    Subjective Comments Pt states his knee is a little stiff this morning.  -GC         Exercise 1    Exercise Name 1 Heel slides  -GC      Time 1 8 min  -GC         Exercise 2    Exercise Name 2 wall slides  -GC      Time 2 8 min  -GC         Exercise 3    Exercise Name 3 Passive knee FLEX stretch  -GC      Reps 3 10  -GC      Time 3 10 secs  -GC         Exercise 4    Exercise Name 4 Hamstring stretch  -GC      Reps 4 15  -GC      Time 4 10 secs  -GC         Exercise 5    Exercise Name 5 prone leg hang  -GC      Time 5 5 min  -GC      Additional Comments 3#  -GC         Exercise 6    Exercise Name 6 passive knee EXT stretch  -GC      Reps 6 10  -GC      Time 6 10 secs  -GC         Exercise 7    Exercise Name 7 QS  -GC      Reps 7 25  -GC      Time 7 5 secs  -GC         Exercise 8    Exercise Name 8 SLR  -GC      Reps 8 25  -GC      Time 8 4#  -GC         Exercise 9    Exercise Name 9 Hip ABD  -GC      Reps 9 25  -GC      Time 9 4#  -GC         Exercise 10    Exercise Name 10 SAQ  -GC      Reps 10 25  -GC      Time 10 4#  -GC         Exercise 11    Exercise Name 11 LAQ  -GC      Reps 11 25  -GC      Time 11 4#  -GC         Exercise 12    Exercise Name 12 prone hip EXT  -GC      Reps 12 25  -GC      Time 12 4#  -GC         Exercise 13    Exercise Name 13 Heel raises  -GC      Reps 13 25  -GC         Exercise 14    Exercise Name 14 Partial squats  -GC      Reps 14 25  -GC         Exercise 15    Exercise Name 15 Froward step ups  "on 6\" step  -GC      Reps 15 20x ea  -GC         Exercise 16    Exercise Name 16 Lateral step overs on 6\" step  -GC      Reps 16 20x  -GC                User Key  (r) = Recorded By, (t) = Taken By, (c) = Cosigned By      Initials Name Provider Type    Stephon Ramos, PT Physical Therapist                                                    Time Calculation:   Start Time: 0720  Stop Time: 0814  Time Calculation (min): 54 min  Therapy Charges for Today       Code Description Service Date Service Provider Modifiers Qty    [id number]  PT THER PROC EA 15 MIN 1/30/2024 Stephon Maradiaga, PT GP 2                      Stephon Maradiaga, PT  1/30/2024     "

## 2024-02-01 ENCOUNTER — HOSPITAL ENCOUNTER (OUTPATIENT)
Dept: PHYSICAL THERAPY | Facility: HOSPITAL | Age: 69
Setting detail: THERAPIES SERIES
Discharge: HOME | End: 2024-02-01
Payer: MEDICARE

## 2024-02-01 DIAGNOSIS — Z96.652 HISTORY OF TOTAL KNEE REPLACEMENT, LEFT: Primary | ICD-10-CM

## 2024-02-01 PROCEDURE — 97110 THERAPEUTIC EXERCISES: CPT | Performed by: PHYSICAL THERAPIST

## 2024-02-01 NOTE — THERAPY TREATMENT NOTE
Outpatient Physical Therapy Ortho Treatment Note   Lucy Knapp     Patient Name: Chad Marte  : 1955  MRN: [id number]  Today's Date: 2024      Visit Date: 2024    Visit Dx:    ICD-10-CM ICD-9-CM   1. History of total knee replacement, left  Z96.652 V43.65       Patient Active Problem List   Diagnosis    Primary osteoarthritis involving multiple joints    Hematuria    Proteinuria    H/O prostate biopsy    Primary osteoarthritis of left knee    Hyperlipidemia LDL goal <100    Encounter for annual wellness visit (AWV) in Medicare patient    Personal history of colonic polyps    Encounter for screening for malignant neoplasm of colon        Past Medical History:   Diagnosis Date    Arthritis     Fracture, humerus     Frozen shoulder     Hypercholesteremia     Knee swelling     Periarthritis of shoulder     Rotator cuff syndrome     Tear of meniscus of knee     Welcome to Medicare preventive visit 2016        Past Surgical History:   Procedure Laterality Date    COLONOSCOPY N/A 2016    Procedure: COLONOSCOPY / polypectomy;  Surgeon: Heath Naik MD;  Location:  [facility];  Service:     COLONOSCOPY W/ POLYPECTOMY N/A 2021    Procedure: COLONOSCOPY WITH POLYPECTOMY;  Surgeon: Heath Naik MD;  Location:  [facility];  Service: Gastroenterology;  Laterality: N/A;  cecal polyp  ascending colon polyps x2  rectal polyp    FRACTURE SURGERY Right     R arm- plate    HAND SURGERY      HERNIA REPAIR      TENDON TRANSFER Right     hand    TONSILLECTOMY                          PT Assessment/Plan       Row Name 24 0900          PT Assessment    Assessment Comments Pt is doing well with increasing ROM and strength and improving function.  -        PT Plan    PT Plan Comments Pt is to continue his HEP daily. He has MD follow up .  -               User Key  (r) = Recorded By, (t) = Taken By, (c) = Cosigned By      Initials Name Provider Type    YSABEL Maradiaga  "Stephon, PT Physical Therapist                       OP Exercises       Row Name 02/01/24 0900             Subjective    Subjective Comments Pt states his knee is doing pretty well.  -GC         Exercise 1    Exercise Name 1 Heel slides  -GC      Time 1 8 min  -GC         Exercise 2    Exercise Name 2 wall slides  -GC      Time 2 8 min  -GC         Exercise 3    Exercise Name 3 Passive knee FLEX stretch  -GC      Reps 3 10  -GC      Time 3 10 secs  -GC         Exercise 4    Exercise Name 4 Hamstring stretch  -GC      Reps 4 15  -GC      Time 4 10 secs  -GC         Exercise 5    Exercise Name 5 prone leg hang  -GC      Time 5 5 min  -GC         Exercise 6    Exercise Name 6 passive knee EXT stretch  -GC      Reps 6 10  -GC      Time 6 10 secs  -GC         Exercise 7    Exercise Name 7 QS  -GC      Reps 7 25  -GC      Time 7 5 secs  -GC         Exercise 8    Exercise Name 8 SLR  -GC      Reps 8 25  -GC      Time 8 4#  -GC         Exercise 9    Exercise Name 9 Hip ABD  -GC      Reps 9 25  -GC      Time 9 4#  -GC         Exercise 10    Exercise Name 10 SAQ  -GC      Reps 10 25  -GC      Time 10 4#  -GC         Exercise 11    Exercise Name 11 LAQ  -GC      Reps 11 25  -GC      Time 11 4#  -GC         Exercise 12    Exercise Name 12 prone hip EXT  -GC      Reps 12 25  -GC      Time 12 4#  -GC         Exercise 13    Exercise Name 13 Heel raises  -GC      Reps 13 25  -GC         Exercise 14    Exercise Name 14 Partial squats  -GC      Reps 14 25  -GC         Exercise 15    Exercise Name 15 Froward step ups on 6\" step  -GC      Reps 15 20x ea  -GC         Exercise 16    Exercise Name 16 Lateral step overs on 6\" step  -GC      Reps 16 20x  -GC                User Key  (r) = Recorded By, (t) = Taken By, (c) = Cosigned By      Initials Name Provider Type    GC Stephon Maradiaga, PT Physical Therapist                                                    Time Calculation:   Start Time: 0900  Stop Time: 0957  Time Calculation (min): 57 " min  Therapy Charges for Today       Code Description Service Date Service Provider Modifiers Qty    [id number] HC PT THER PROC EA 15 MIN 2/1/2024 Stephon Maradiaga, PT GP 2                      Stephon Maradiaga, PT  2/1/2024

## 2024-02-05 ENCOUNTER — HOSPITAL ENCOUNTER (OUTPATIENT)
Dept: PHYSICAL THERAPY | Facility: HOSPITAL | Age: 69
Setting detail: THERAPIES SERIES
Discharge: HOME | End: 2024-02-05
Payer: MEDICARE

## 2024-02-05 DIAGNOSIS — Z96.652 HISTORY OF TOTAL KNEE REPLACEMENT, LEFT: Primary | ICD-10-CM

## 2024-02-05 PROCEDURE — 97110 THERAPEUTIC EXERCISES: CPT | Performed by: PHYSICAL THERAPIST

## 2024-02-05 NOTE — THERAPY TREATMENT NOTE
Outpatient Physical Therapy Ortho Treatment Note   Lucy Knapp     Patient Name: Chad Marte  : 1955  MRN: [id number]  Today's Date: 2024      Visit Date: 2024    Visit Dx:    ICD-10-CM ICD-9-CM   1. History of total knee replacement, left  Z96.652 V43.65       Patient Active Problem List   Diagnosis    Primary osteoarthritis involving multiple joints    Hematuria    Proteinuria    H/O prostate biopsy    Primary osteoarthritis of left knee    Hyperlipidemia LDL goal <100    Encounter for annual wellness visit (AWV) in Medicare patient    Personal history of colonic polyps    Encounter for screening for malignant neoplasm of colon        Past Medical History:   Diagnosis Date    Arthritis     Fracture, humerus     Frozen shoulder     Hypercholesteremia     Knee swelling     Periarthritis of shoulder     Rotator cuff syndrome     Tear of meniscus of knee     Welcome to Medicare preventive visit 2016        Past Surgical History:   Procedure Laterality Date    COLONOSCOPY N/A 2016    Procedure: COLONOSCOPY / polypectomy;  Surgeon: Heath Naik MD;  Location:  [facility];  Service:     COLONOSCOPY W/ POLYPECTOMY N/A 2021    Procedure: COLONOSCOPY WITH POLYPECTOMY;  Surgeon: Heath Naik MD;  Location:  [facility];  Service: Gastroenterology;  Laterality: N/A;  cecal polyp  ascending colon polyps x2  rectal polyp    FRACTURE SURGERY Right     R arm- plate    HAND SURGERY      HERNIA REPAIR      TENDON TRANSFER Right     hand    TONSILLECTOMY          PT Ortho       Row Name 24 0720       Subjective    Subjective Comments Pt states his knee is feeling pretty good. He denies any pain and reports no issues with his ADL function.  -GC       Left Lower Ext    Lt Knee Extension/Flexion AROM 0-134 degrees  -GC       MMT Left Lower Ext    Lt Hip Flexion MMT, Gross Movement (5/5) normal  -GC    Lt Hip Extension MMT, Gross Movement (5/5) normal  -GC    Lt Hip  ABduction MMT, Gross Movement (5/5) normal  -GC    Lt Hip ADduction MMT, Gross Movement (5/5) normal  -GC    Lt Knee Extension MMT, Gross Movement (5/5) normal  -GC    Lt Knee Flexion MMT, Gross Movement (5/5) normal  -GC    Lt Ankle Plantarflexion MMT, Gross Movement (5/5) normal  -GC    Lt Ankle Dorsiflexion MMT, Gross Movement (5/5) normal  -GC       Gait/Stairs (Locomotion)    Comment, (Gait/Stairs) Pt ambulates well on levels and stairs  -              User Key  (r) = Recorded By, (t) = Taken By, (c) = Cosigned By      Initials Name Provider Type     Stephon Maradiaga, PT Physical Therapist                                 PT Assessment/Plan       Row Name 02/05/24 0706          PT Assessment    Assessment Comments Pt is doing well with excellent knee ROM and strength. He has progressed nicely towards his goals. Feel he no longer requries skilled therapy services and he is ready to continue with his HEP only.  -        PT Plan    PT Plan Comments Pt is to continue his HEP daily. He has MD follow up this morning. Anticipate discharge.  -               User Key  (r) = Recorded By, (t) = Taken By, (c) = Cosigned By      Initials Name Provider Type     Stephon Maradiaga, PT Physical Therapist                       OP Exercises       Row Name 02/05/24 0756             Subjective    Subjective Comments Pt states his knee is feeling pretty good. He denies any pain and reports no issues with his ADL function.  -         Exercise 1    Exercise Name 1 Heel slides  -GC      Time 1 8 min  -GC         Exercise 2    Exercise Name 2 wall slides  -GC      Time 2 8 min  -GC         Exercise 3    Exercise Name 3 Passive knee FLEX stretch  -GC      Reps 3 10  -GC      Time 3 10 secs  -GC         Exercise 4    Exercise Name 4 Hamstring stretch  -GC      Reps 4 15  -GC      Time 4 10 secs  -GC         Exercise 5    Exercise Name 5 prone leg hang  -GC      Time 5 5 min  -GC         Exercise 6    Exercise Name 6 passive knee EXT  "stretch  -GC      Reps 6 10  -GC      Time 6 10 secs  -GC         Exercise 7    Exercise Name 7 QS  -GC      Reps 7 25  -GC      Time 7 5 secs  -GC         Exercise 8    Exercise Name 8 SLR  -GC      Reps 8 25  -GC      Time 8 4#  -GC         Exercise 9    Exercise Name 9 Hip ABD  -GC      Reps 9 25  -GC      Time 9 4#  -GC         Exercise 10    Exercise Name 10 SAQ  -GC      Reps 10 25  -GC      Time 10 4#  -GC         Exercise 11    Exercise Name 11 LAQ  -GC      Reps 11 25  -GC      Time 11 4#  -GC         Exercise 12    Exercise Name 12 prone hip EXT  -GC      Reps 12 25  -GC      Time 12 4#  -GC         Exercise 13    Exercise Name 13 Heel raises  -GC      Reps 13 25  -GC         Exercise 14    Exercise Name 14 Partial squats  -GC      Reps 14 25  -GC         Exercise 15    Exercise Name 15 Froward step ups on 6\" step  -GC      Reps 15 20x ea  -GC         Exercise 16    Exercise Name 16 Lateral step overs on 6\" step  -GC      Reps 16 20x  -GC                User Key  (r) = Recorded By, (t) = Taken By, (c) = Cosigned By      Initials Name Provider Type     Stephon Maradiaga, PT Physical Therapist                                  PT OP Goals       Row Name 02/05/24 0720          PT Short Term Goals    STG Date to Achieve 02/05/24  -     STG 1 Increase left knee AROM to 0-120 degrees with testing.  -     STG 1 Progress Met  -     STG 2 Increase left LE strength to at least 4+/5 all planes with testing.  -     STG 2 Progress Met  -     STG 3 Pt will be independent with his HEP issued by this therapist.  -     STG 3 Progress Met  -        Long Term Goals    LTG Date to Achieve 02/26/24  -     LTG 1 Increase left knee AROM to 0-130 degrees with testing.  -     LTG 1 Progress Met  -     LTG 2 Increase left LE strength to 5/5 all planes with testing.  -     LTG 2 Progress Met  -     LTG 3 Pt will ambulate normally on levels and stairs without assistive device.  -     LTG 3 Progress Met  -GC  "    LTG 4 Pt will be independent with all ADLs and have a LEFS score > 70.  -GC     LTG 4 Progress Partially Met  -GC               User Key  (r) = Recorded By, (t) = Taken By, (c) = Cosigned By      Initials Name Provider Type    Stephon Ramos, PT Physical Therapist                                   Time Calculation:   Start Time: 0720  Stop Time: 0814  Time Calculation (min): 54 min  Therapy Charges for Today       Code Description Service Date Service Provider Modifiers Qty    [id number]  PT THER PROC EA 15 MIN 2/5/2024 Stephon Maradiaga, PT GP 2                      Stephon Maradiaga, PT  2/5/2024

## 2024-02-09 DIAGNOSIS — E78.5 HYPERLIPIDEMIA LDL GOAL <100: ICD-10-CM

## 2024-02-12 RX ORDER — ROSUVASTATIN CALCIUM 20 MG/1
20 TABLET, COATED ORAL
Qty: 90 TABLET | Refills: 1 | Status: SHIPPED | OUTPATIENT
Start: 2024-02-12

## 2024-03-20 ENCOUNTER — TELEPHONE (OUTPATIENT)
Dept: INTERNAL MEDICINE | Facility: CLINIC | Age: 69
End: 2024-03-20
Payer: MEDICARE

## 2024-03-25 DIAGNOSIS — E78.5 HYPERLIPIDEMIA LDL GOAL <100: Primary | ICD-10-CM

## 2024-03-25 DIAGNOSIS — E78.5 HYPERLIPIDEMIA LDL GOAL <100: ICD-10-CM

## 2024-03-27 ENCOUNTER — TELEPHONE (OUTPATIENT)
Dept: GASTROENTEROLOGY | Facility: CLINIC | Age: 69
End: 2024-03-27
Payer: MEDICARE

## 2024-03-27 NOTE — TELEPHONE ENCOUNTER
Caller: Chad Marte    Relationship: Self    Best call back number: [phone]     What is the best time to reach you: ANYTIME, OKAY TO LEAVE VM    Who are you requesting to speak with (clinical staff, provider,  specific staff member): CLINICAL    What was the call regarding: PT IS WANTING TO KNOW IF HE CAN DO THE COLONOSCOPY WITH HAVING A KNEE REPLACEMENT 3 MONTHS AGO. PT WOULD ALSO LIKE TO KNOW WHAT PREP HE SHOULD DO, PACKET ASKS FOR PREP TYPE. PLEASE CALL PT BACK AND ADVISE.

## 2024-03-27 NOTE — TELEPHONE ENCOUNTER
Spoke with patient.  He states knee replacement on 12/22/2023.  Explained yes, can schedule for colonoscopy.  We normally wait 3 months after surgery like that.  He states my wife will be having knee replacement on 05/04/2024.  Might need to schedule sometime 07/2024.  Explained fill out fast track and return.  Once received and reviewed, will call to schedule.  He understands.

## 2024-04-02 LAB
ALBUMIN SERPL-MCNC: 3.9 G/DL (ref 3.5–5.2)
ALBUMIN/GLOB SERPL: 1.8 G/DL
ALP SERPL-CCNC: 72 U/L (ref 39–117)
ALT SERPL-CCNC: 15 U/L (ref 1–41)
AST SERPL-CCNC: 15 U/L (ref 1–40)
BASOPHILS # BLD AUTO: 0.03 10*3/MM3 (ref 0–0.2)
BASOPHILS NFR BLD AUTO: 0.6 % (ref 0–1.5)
BILIRUB SERPL-MCNC: 0.6 MG/DL (ref 0–1.2)
BUN SERPL-MCNC: 20 MG/DL (ref 8–23)
BUN/CREAT SERPL: 21.1 (ref 7–25)
CALCIUM SERPL-MCNC: 9.2 MG/DL (ref 8.6–10.5)
CHLORIDE SERPL-SCNC: 104 MMOL/L (ref 98–107)
CHOLEST SERPL-MCNC: 174 MG/DL (ref 0–200)
CHOLEST/HDLC SERPL: 2.9 {RATIO}
CO2 SERPL-SCNC: 31.1 MMOL/L (ref 22–29)
CREAT SERPL-MCNC: 0.95 MG/DL (ref 0.76–1.27)
EGFRCR SERPLBLD CKD-EPI 2021: 85.6 ML/MIN/1.73
EOSINOPHIL # BLD AUTO: 0.16 10*3/MM3 (ref 0–0.4)
EOSINOPHIL NFR BLD AUTO: 3 % (ref 0.3–6.2)
ERYTHROCYTE [DISTWIDTH] IN BLOOD BY AUTOMATED COUNT: 12.2 % (ref 12.3–15.4)
GLOBULIN SER CALC-MCNC: 2.2 GM/DL
GLUCOSE SERPL-MCNC: 92 MG/DL (ref 65–99)
HCT VFR BLD AUTO: 43.2 % (ref 37.5–51)
HDLC SERPL-MCNC: 60 MG/DL (ref 40–60)
HGB BLD-MCNC: 14.1 G/DL (ref 13–17.7)
IMM GRANULOCYTES # BLD AUTO: 0.02 10*3/MM3 (ref 0–0.05)
IMM GRANULOCYTES NFR BLD AUTO: 0.4 % (ref 0–0.5)
LDLC SERPL CALC-MCNC: 102 MG/DL (ref 0–100)
LYMPHOCYTES # BLD AUTO: 1.21 10*3/MM3 (ref 0.7–3.1)
LYMPHOCYTES NFR BLD AUTO: 22.5 % (ref 19.6–45.3)
MCH RBC QN AUTO: 27.9 PG (ref 26.6–33)
MCHC RBC AUTO-ENTMCNC: 32.6 G/DL (ref 31.5–35.7)
MCV RBC AUTO: 85.5 FL (ref 79–97)
MONOCYTES # BLD AUTO: 0.43 10*3/MM3 (ref 0.1–0.9)
MONOCYTES NFR BLD AUTO: 8 % (ref 5–12)
NEUTROPHILS # BLD AUTO: 3.52 10*3/MM3 (ref 1.7–7)
NEUTROPHILS NFR BLD AUTO: 65.5 % (ref 42.7–76)
NRBC BLD AUTO-RTO: 0 /100 WBC (ref 0–0.2)
PLATELET # BLD AUTO: 268 10*3/MM3 (ref 140–450)
POTASSIUM SERPL-SCNC: 4.7 MMOL/L (ref 3.5–5.2)
PROT SERPL-MCNC: 6.1 G/DL (ref 6–8.5)
RBC # BLD AUTO: 5.05 10*6/MM3 (ref 4.14–5.8)
SODIUM SERPL-SCNC: 141 MMOL/L (ref 136–145)
TRIGL SERPL-MCNC: 61 MG/DL (ref 0–150)
VLDLC SERPL CALC-MCNC: 12 MG/DL (ref 5–40)
WBC # BLD AUTO: 5.37 10*3/MM3 (ref 3.4–10.8)

## 2024-04-04 ENCOUNTER — TELEPHONE (OUTPATIENT)
Dept: GASTROENTEROLOGY | Facility: CLINIC | Age: 69
End: 2024-04-04
Payer: MEDICARE

## 2024-04-04 NOTE — TELEPHONE ENCOUNTER
FAST TRACK - 3 YEAR RECALL 06/2024  LAST COLONOSCOPY 06/11/2021  PERSONAL HISTORY POLYPS  NO FAMILY HISTORY CRC/P  SCHEDULE AT [place].

## 2024-04-05 DIAGNOSIS — Z86.010 PERSONAL HISTORY OF COLONIC POLYPS: Primary | ICD-10-CM

## 2024-04-08 NOTE — TELEPHONE ENCOUNTER
Spoke with patient.  He states need to schedule last week of July.    Scheduled at [place] 07/22/2024 at 9:30am - arrive 8:30am.  Email prep instructions.    [email or web address]

## 2024-04-10 ENCOUNTER — OFFICE VISIT (OUTPATIENT)
Dept: INTERNAL MEDICINE | Facility: CLINIC | Age: 69
End: 2024-04-10
Payer: MEDICARE

## 2024-04-10 VITALS
WEIGHT: 209.2 LBS | OXYGEN SATURATION: 100 % | BODY MASS INDEX: 27.73 KG/M2 | HEART RATE: 62 BPM | TEMPERATURE: 97.5 F | DIASTOLIC BLOOD PRESSURE: 90 MMHG | HEIGHT: 73 IN | SYSTOLIC BLOOD PRESSURE: 134 MMHG

## 2024-04-10 DIAGNOSIS — E78.5 HYPERLIPIDEMIA LDL GOAL <100: Primary | ICD-10-CM

## 2024-04-10 DIAGNOSIS — M19.041 OSTEOARTHRITIS OF FINGER OF RIGHT HAND: ICD-10-CM

## 2024-04-10 DIAGNOSIS — H61.21 HEARING LOSS DUE TO CERUMEN IMPACTION, RIGHT: ICD-10-CM

## 2024-04-10 PROBLEM — Z12.5 ENCOUNTER FOR SCREENING FOR MALIGNANT NEOPLASM OF PROSTATE: Status: ACTIVE | Noted: 2021-01-27

## 2024-04-10 NOTE — PROGRESS NOTES
Procedure   Ear Cerumen Removal    Date/Time: 4/10/2024 8:18 AM    Performed by: Rita Hawley APRN  Authorized by: Rita Hawley APRN  Consent: Verbal consent obtained.  Risks and benefits: risks, benefits and alternatives were discussed  Consent given by: patient  Patient understanding: patient states understanding of the procedure being performed  Patient consent: the patient's understanding of the procedure matches consent given  Patient identity confirmed: verbally with patient    Anesthesia:  Local Anesthetic: none  Location details: right ear  Patient tolerance: patient tolerated the procedure well with no immediate complications  Procedure type: irrigation   Sedation:  Patient sedated: no

## 2024-04-10 NOTE — PROGRESS NOTES
Chief Complaint   Patient presents with    Hyperlipidemia     6 month follow up       Subjective     Chad Marte is a 69 y.o. male being seen for a follow up appointment today regarding hyperlipidemia and OA knees/hands. He is on Crestor 20mg for hyperlipidemia, switched from Lipitor.  He has re-joined  and lost 6 pounds, cutting out a lot of sugar.    He is on Mobic 15 mg for OA of his right hand, but reduced this to Prn after his Left total knee replacement. He remains active and stays in [place] during the winter.     He is complaining of right ear being plugged, affecting hearing.     History of Present Illness     Allergies   Allergen Reactions    Lipitor [Atorvastatin] Myalgia         Current Outpatient Medications:     acetaminophen (Tylenol 8 Hour) 650 MG 8 hr tablet, Take 1 tablet by mouth Every 8 (Eight) Hours As Needed for Mild Pain., Disp: , Rfl:     meloxicam (MOBIC) 15 MG tablet, Take 1 tablet by mouth Daily., Disp: 90 tablet, Rfl: 1    Multiple Vitamins-Minerals (multivitamin and minerals) liquid liquid, Take  by mouth Daily., Disp: , Rfl:     Omega-3 Fatty Acids (fish oil) 1000 MG capsule capsule, Take  by mouth Daily With Breakfast., Disp: , Rfl:     rosuvastatin (CRESTOR) 20 MG tablet, TAKE 1 TABLET BY MOUTH AT NIGHT, Disp: 90 tablet, Rfl: 1    TURMERIC PO, Take  by mouth., Disp: , Rfl:     Zn-Pyg Afri-Nettle-Saw Palmet (SAW PALMETTO COMPLEX PO), Take  by mouth., Disp: , Rfl:     Monovisc 88 MG/4ML solution prefilled syringe injection, Inject 4 mL into the appropriate joint as directed by provider 1 (One) Time., Disp: , Rfl:     The following portions of the patient's history were reviewed and updated as appropriate: allergies, current medications, past family history, past medical history, past social history, past surgical history, and problem list.    Review of Systems   Constitutional: Negative.    Eyes: Negative.    Respiratory: Negative.     Cardiovascular: Negative.  Negative for chest pain,  palpitations and leg swelling.   Endocrine: Negative.    Genitourinary: Negative.    Musculoskeletal:  Positive for arthralgias.   Allergic/Immunologic: Negative.    Neurological: Negative.    Hematological: Negative.  Negative for adenopathy. Does not bruise/bleed easily.   Psychiatric/Behavioral: Negative.     All other systems reviewed and are negative.      Assessment     Physical Exam  Vitals reviewed.   Constitutional:       Appearance: Normal appearance.   Cardiovascular:      Rate and Rhythm: Normal rate and regular rhythm.      Pulses: Normal pulses.      Heart sounds: Normal heart sounds.   Skin:     General: Skin is warm and dry.   Neurological:      General: No focal deficit present.      Mental Status: He is alert and oriented to person, place, and time.   Psychiatric:         Mood and Affect: Mood normal.         Behavior: Behavior normal.         Thought Content: Thought content normal.         Plan     His fasting labs were reviewed with the patient from last week.      Diagnosis Plan   1. Hyperlipidemia LDL goal <100  CBC & Differential    Comprehensive Metabolic Panel    Lipid Panel With / Chol / HDL Ratio    LDL goal < 100, improving      2. Osteoarthritis of finger of right hand      On Mobic 15mg daily prn      3. Hearing loss due to cerumen impaction, right  Ear Cerumen Removal    lavage completed           AWV in 6 months w labs

## 2024-07-19 ENCOUNTER — ANESTHESIA EVENT (OUTPATIENT)
Dept: PERIOP | Facility: HOSPITAL | Age: 69
End: 2024-07-19
Payer: MEDICARE

## 2024-07-22 ENCOUNTER — HOSPITAL ENCOUNTER (OUTPATIENT)
Facility: HOSPITAL | Age: 69
Setting detail: HOSPITAL OUTPATIENT SURGERY
Discharge: HOME | End: 2024-07-22
Attending: INTERNAL MEDICINE | Admitting: INTERNAL MEDICINE
Payer: MEDICARE

## 2024-07-22 ENCOUNTER — TELEPHONE (OUTPATIENT)
Dept: GASTROENTEROLOGY | Facility: CLINIC | Age: 69
End: 2024-07-22
Payer: MEDICARE

## 2024-07-22 ENCOUNTER — ANESTHESIA (OUTPATIENT)
Dept: PERIOP | Facility: HOSPITAL | Age: 69
End: 2024-07-22
Payer: MEDICARE

## 2024-07-22 VITALS
HEART RATE: 58 BPM | DIASTOLIC BLOOD PRESSURE: 79 MMHG | OXYGEN SATURATION: 97 % | SYSTOLIC BLOOD PRESSURE: 115 MMHG | RESPIRATION RATE: 17 BRPM | HEIGHT: 73 IN | WEIGHT: 197 LBS | BODY MASS INDEX: 26.11 KG/M2 | TEMPERATURE: 97.8 F

## 2024-07-22 PROCEDURE — G0105 COLORECTAL SCRN; HI RISK IND: HCPCS | Performed by: INTERNAL MEDICINE

## 2024-07-22 PROCEDURE — 25810000003 LACTATED RINGERS PER 1000 ML: Performed by: NURSE ANESTHETIST, CERTIFIED REGISTERED

## 2024-07-22 PROCEDURE — 25010000002 PROPOFOL 200 MG/20ML EMULSION: Performed by: NURSE ANESTHETIST, CERTIFIED REGISTERED

## 2024-07-22 RX ORDER — LIDOCAINE HYDROCHLORIDE 20 MG/ML
INJECTION, SOLUTION INFILTRATION; PERINEURAL AS NEEDED
Status: DISCONTINUED | OUTPATIENT
Start: 2024-07-22 | End: 2024-07-22 | Stop reason: SURG

## 2024-07-22 RX ORDER — SODIUM CHLORIDE 0.9 % (FLUSH) 0.9 %
10 SYRINGE (ML) INJECTION EVERY 12 HOURS SCHEDULED
Status: DISCONTINUED | OUTPATIENT
Start: 2024-07-22 | End: 2024-07-22 | Stop reason: HOSPADM

## 2024-07-22 RX ORDER — ONDANSETRON 2 MG/ML
4 INJECTION INTRAMUSCULAR; INTRAVENOUS ONCE AS NEEDED
Status: CANCELLED | OUTPATIENT
Start: 2024-07-22

## 2024-07-22 RX ORDER — SODIUM CHLORIDE 0.9 % (FLUSH) 0.9 %
10 SYRINGE (ML) INJECTION AS NEEDED
Status: DISCONTINUED | OUTPATIENT
Start: 2024-07-22 | End: 2024-07-22 | Stop reason: HOSPADM

## 2024-07-22 RX ORDER — LIDOCAINE HYDROCHLORIDE 10 MG/ML
0.5 INJECTION, SOLUTION INFILTRATION; PERINEURAL ONCE AS NEEDED
Status: DISCONTINUED | OUTPATIENT
Start: 2024-07-22 | End: 2024-07-22 | Stop reason: HOSPADM

## 2024-07-22 RX ORDER — SODIUM CHLORIDE 9 MG/ML
40 INJECTION, SOLUTION INTRAVENOUS AS NEEDED
Status: DISCONTINUED | OUTPATIENT
Start: 2024-07-22 | End: 2024-07-22 | Stop reason: HOSPADM

## 2024-07-22 RX ORDER — SODIUM CHLORIDE, SODIUM LACTATE, POTASSIUM CHLORIDE, CALCIUM CHLORIDE 600; 310; 30; 20 MG/100ML; MG/100ML; MG/100ML; MG/100ML
9 INJECTION, SOLUTION INTRAVENOUS CONTINUOUS
Status: DISCONTINUED | OUTPATIENT
Start: 2024-07-22 | End: 2024-07-22 | Stop reason: HOSPADM

## 2024-07-22 RX ORDER — PROPOFOL 10 MG/ML
INJECTION, EMULSION INTRAVENOUS AS NEEDED
Status: DISCONTINUED | OUTPATIENT
Start: 2024-07-22 | End: 2024-07-22 | Stop reason: SURG

## 2024-07-22 RX ORDER — SODIUM CHLORIDE, SODIUM LACTATE, POTASSIUM CHLORIDE, CALCIUM CHLORIDE 600; 310; 30; 20 MG/100ML; MG/100ML; MG/100ML; MG/100ML
100 INJECTION, SOLUTION INTRAVENOUS ONCE
Status: CANCELLED | OUTPATIENT
Start: 2024-07-22 | End: 2024-07-22

## 2024-07-22 RX ADMIN — PROPOFOL 40 MG: 10 INJECTION, EMULSION INTRAVENOUS at 10:01

## 2024-07-22 RX ADMIN — LIDOCAINE HYDROCHLORIDE 100 MG: 20 INJECTION, SOLUTION INFILTRATION; PERINEURAL at 09:46

## 2024-07-22 RX ADMIN — PROPOFOL 40 MG: 10 INJECTION, EMULSION INTRAVENOUS at 09:54

## 2024-07-22 RX ADMIN — PROPOFOL 40 MG: 10 INJECTION, EMULSION INTRAVENOUS at 09:51

## 2024-07-22 RX ADMIN — SODIUM CHLORIDE, POTASSIUM CHLORIDE, SODIUM LACTATE AND CALCIUM CHLORIDE 9 ML/HR: 600; 310; 30; 20 INJECTION, SOLUTION INTRAVENOUS at 08:51

## 2024-07-22 RX ADMIN — PROPOFOL 40 MG: 10 INJECTION, EMULSION INTRAVENOUS at 09:48

## 2024-07-22 RX ADMIN — PROPOFOL 50 MG: 10 INJECTION, EMULSION INTRAVENOUS at 09:46

## 2024-07-22 RX ADMIN — PROPOFOL 40 MG: 10 INJECTION, EMULSION INTRAVENOUS at 09:57

## 2024-07-22 NOTE — TELEPHONE ENCOUNTER
----- Message from Heath Naik sent at 7/22/2024 10:15 AM EDT -----  Normal Exam 3 years out so recall in 5 years

## 2024-07-22 NOTE — ANESTHESIA PREPROCEDURE EVALUATION
Anesthesia Evaluation     Patient summary reviewed and Nursing notes reviewed   no history of anesthetic complications:   NPO Solid Status: > 8 hours  NPO Liquid Status: > 8 hours           Airway   Mallampati: III  TM distance: >3 FB  Neck ROM: full  No difficulty expected  Dental - normal exam     Pulmonary - negative pulmonary ROS and normal exam    breath sounds clear to auscultation  Cardiovascular - normal exam  Exercise tolerance: good (4-7 METS)    Rhythm: regular  Rate: normal    (+) hyperlipidemia      Neuro/Psych- negative ROS  GI/Hepatic/Renal/Endo - negative ROS     Musculoskeletal     Abdominal  - normal exam   Substance History   (+) alcohol use     OB/GYN negative ob/gyn ROS         Other   arthritis,                 Anesthesia Plan    ASA 2     MAC     intravenous induction     Anesthetic plan, risks, benefits, and alternatives have been provided, discussed and informed consent has been obtained with: patient.  Pre-procedure education provided  Use of blood products discussed with patient  Consented to blood products.      CODE STATUS:

## 2024-07-22 NOTE — BRIEF OP NOTE
COLONOSCOPY  Progress Note    Chad Marte  7/22/2024    Pre-op Diagnosis:   Personal history of colonic polyps [Z86.010]       Post-Op Diagnosis Codes:     * Personal history of colonic polyps [Z86.010]    Procedure/CPT® Codes:        Procedure(s):  COLONOSCOPY              Surgeon(s):  Heath Naik MD    Anesthesia: Monitored Anesthesia Care    Staff:   Circulator: Dolores Turner RN  Scrub Person: Lori Ivey; Nella Freedman         Estimated Blood Loss: none    Urine Voided: * No values recorded between 7/22/2024  9:40 AM and 7/22/2024 10:14 AM *    Specimens:                None          Drains: * No LDAs found *    Findings: Colon to TI good prep  Normal Exam        Complications: none          Heath Naik MD     Date: 7/22/2024  Time: 10:14 EDT

## 2024-07-22 NOTE — ANESTHESIA POSTPROCEDURE EVALUATION
Patient: Chad Marte    Procedure Summary       Date: 07/22/24 Room / Location: [facility] ENDOSCOPY 1 /  [facility] OR    Anesthesia Start: 0942 Anesthesia Stop: 1013    Procedure: COLONOSCOPY Diagnosis:       Personal history of colonic polyps      (Personal history of colonic polyps [Z86.010])    Surgeons: Heath Naik MD Provider: Jaida Quevedo CRNA    Anesthesia Type: MAC ASA Status: 2            Anesthesia Type: MAC    Vitals  Vitals Value Taken Time   /74 07/22/24 1040   Temp     Pulse 55 07/22/24 1043   Resp     SpO2 97 % 07/22/24 1043   Vitals shown include unfiled device data.        Post Anesthesia Care and Evaluation    Patient location during evaluation: bedside  Patient participation: complete - patient participated  Level of consciousness: awake and alert  Pain score: 0  Pain management: adequate    Airway patency: patent  Anesthetic complications: No anesthetic complications  PONV Status: none  Cardiovascular status: acceptable  Respiratory status: acceptable  Hydration status: acceptable  No anesthesia care post op

## 2024-07-22 NOTE — H&P
Patient Care Team:  Rita Hawley APRN as PCP - General  Zuhair Lopez MD as Consulting Physician (Urology)  Clayton Keene MD as Surgeon (Orthopedic Surgery)    CHIEF COMPLAINT: Personal hx colon polyps    HISTORY OF PRESENT ILLNESS:  Last exam was 2021    Past Medical History:   Diagnosis Date    Arthritis     Fracture, humerus     Frozen shoulder     Hypercholesteremia     Knee swelling     Periarthritis of shoulder     Rotator cuff syndrome     Tear of meniscus of knee     Welcome to Medicare preventive visit 03/28/2016     Past Surgical History:   Procedure Laterality Date    COLONOSCOPY N/A 05/27/2016    Procedure: COLONOSCOPY / polypectomy;  Surgeon: Heath Naik MD;  Location: [facility] OR;  Service:     COLONOSCOPY W/ POLYPECTOMY N/A 06/11/2021    Procedure: COLONOSCOPY WITH POLYPECTOMY;  Surgeon: Heath Naik MD;  Location:  [facility] OR;  Service: Gastroenterology;  Laterality: N/A;  cecal polyp  ascending colon polyps x2  rectal polyp    FRACTURE SURGERY Right     R arm- plate    HAND SURGERY  1996    HERNIA REPAIR      TENDON TRANSFER Right     hand    TONSILLECTOMY       Family History   Problem Relation Age of Onset    Cancer Father      Social History     Tobacco Use    Smoking status: Never     Passive exposure: Never    Smokeless tobacco: Never   Vaping Use    Vaping status: Never Used   Substance Use Topics    Alcohol use: Yes     Alcohol/week: 5.0 standard drinks of alcohol     Types: 3 Glasses of wine, 2 Cans of beer per week    Drug use: No     Medications Prior to Admission   Medication Sig Dispense Refill Last Dose    acetaminophen (Tylenol 8 Hour) 650 MG 8 hr tablet Take 1 tablet by mouth Every 8 (Eight) Hours As Needed for Mild Pain.   7/21/2024 at 1600    meloxicam (MOBIC) 15 MG tablet Take 1 tablet by mouth Daily. 90 tablet 1 Past Week    TURMERIC PO Take  by mouth.   Past Week    Zn-Pyg Afri-Nettle-Saw Palmet (SAW PALMETTO COMPLEX PO) Take  by mouth.   Past  "Week    Multiple Vitamins-Minerals (multivitamin and minerals) liquid liquid Take  by mouth Daily.   7/17/2024    Omega-3 Fatty Acids (fish oil) 1000 MG capsule capsule Take  by mouth Daily With Breakfast.   7/17/2024    rosuvastatin (CRESTOR) 20 MG tablet TAKE 1 TABLET BY MOUTH AT NIGHT 90 tablet 1 7/20/2024     Allergies:  Lipitor [atorvastatin]    REVIEW OF SYSTEMS:  Please see the above history of present illness for pertinent positives and negatives.  The remainder of the patient's systems have been reviewed and are negative.     Vital Signs  Temp:  [97.8 °F (36.6 °C)] 97.8 °F (36.6 °C)  Heart Rate:  [59] 59  Resp:  [17] 17  BP: (138)/(85) 138/85    Flowsheet Rows      Flowsheet Row First Filed Value   Admission Height 185.4 cm (73\") Documented at 07/22/2024 0842   Admission Weight 89.4 kg (197 lb) Documented at 07/22/2024 0828             Physical Exam:  Physical Exam   Constitutional: Patient appears well-developed and well-nourished and in no acute distress   HEENT:   Head: Normocephalic and atraumatic.   Eyes:  Pupils are equal, round, and reactive to light. EOM are intact. Sclerae are anicteric and non-injected.  Mouth and Throat: Patient has moist mucous membranes. Oropharynx is clear of any erythema or exudate.     Neck: Neck supple. No JVD present. No thyromegaly present. No lymphadenopathy present.  Cardiovascular: Regular rate, regular rhythm, S1 normal and S2 normal.  Exam reveals no gallop and no friction rub.  No murmur heard.  Pulmonary/Chest: Lungs are clear to auscultation bilaterally. No respiratory distress. No wheezes. No rhonchi. No rales.   Abdominal: Soft. Bowel sounds are normal. No distension and no mass. There is no hepatosplenomegaly. There is no tenderness.   Musculoskeletal: Normal Muscle tone  Extremities: No edema. Pulses are palpable in all 4 extremities.  Neurological: Patient is alert and oriented to person, place, and time. Cranial nerves II-XII are grossly intact with no focal " deficits.  Skin: Skin is warm. No rash noted. Nails show no clubbing.  No cyanosis or erythema.    Debilities/Disabilities Identified: None  Emotional Behavior: Appropriate     Results Review:   I reviewed the patient's new clinical results.    Lab Results (most recent)       None            Imaging Results (Most Recent)       None          reviewed    ECG/EMG Results (most recent)       None          reviewed    Assessment & Plan   Personal hx colon polyps/  colonoscopy      I discussed the patient's findings and my recommendations with patient.     Heath Naik MD  07/22/24  09:30 EDT    Time: 10 min prior to procedure.

## 2024-08-21 DIAGNOSIS — E78.5 HYPERLIPIDEMIA LDL GOAL <100: ICD-10-CM

## 2024-08-21 DIAGNOSIS — M15.9 PRIMARY OSTEOARTHRITIS INVOLVING MULTIPLE JOINTS: ICD-10-CM

## 2024-08-21 RX ORDER — ROSUVASTATIN CALCIUM 20 MG/1
20 TABLET, COATED ORAL
Qty: 90 TABLET | Refills: 3 | Status: SHIPPED | OUTPATIENT
Start: 2024-08-21

## 2024-08-21 RX ORDER — MELOXICAM 15 MG/1
15 TABLET ORAL DAILY
Qty: 90 TABLET | Refills: 3 | Status: SHIPPED | OUTPATIENT
Start: 2024-08-21

## 2024-08-21 NOTE — TELEPHONE ENCOUNTER
Rx Refill Note  Requested Prescriptions     Pending Prescriptions Disp Refills    meloxicam (MOBIC) 15 MG tablet [Pharmacy Med Name: Meloxicam 15 MG Oral Tablet] 90 tablet 3     Sig: Take 1 tablet by mouth Daily.    rosuvastatin (CRESTOR) 20 MG tablet [Pharmacy Med Name: Rosuvastatin Calcium 20 MG Oral Tablet] 90 tablet 3     Sig: TAKE 1 TABLET BY MOUTH AT NIGHT      Last office visit with prescribing clinician: 4/10/2024   Last telemedicine visit with prescribing clinician: Visit date not found   Next office visit with prescribing clinician: 10/11/2024                         Would you like a call back once the refill request has been completed: [] Yes [] No    If the office needs to give you a call back, can they leave a voicemail: [] Yes [] No    Guerrero Gao MA  08/21/24, 16:06 EDT

## 2024-10-01 ENCOUNTER — TELEPHONE (OUTPATIENT)
Dept: INTERNAL MEDICINE | Facility: CLINIC | Age: 69
End: 2024-10-01

## 2024-10-01 NOTE — TELEPHONE ENCOUNTER
Hub staff attempted to follow warm transfer process and was unsuccessful     Caller: Chad Marte    Relationship to patient: Self    Best call back number:     [phone]       Patient is needing:     PATIENT IS NEEDING TO CHANGE HIS LAB APPOINTMENT    PLEASE RETURN HIS CALL

## 2024-10-02 DIAGNOSIS — E78.5 HYPERLIPIDEMIA LDL GOAL <100: ICD-10-CM

## 2024-10-03 LAB
ALBUMIN SERPL-MCNC: 3.8 G/DL (ref 3.5–5.2)
ALBUMIN/GLOB SERPL: 2.2 G/DL
ALP SERPL-CCNC: 56 U/L (ref 39–117)
ALT SERPL-CCNC: 13 U/L (ref 1–41)
AST SERPL-CCNC: 16 U/L (ref 1–40)
BASOPHILS # BLD AUTO: 0.03 10*3/MM3 (ref 0–0.2)
BASOPHILS NFR BLD AUTO: 0.5 % (ref 0–1.5)
BILIRUB SERPL-MCNC: 0.7 MG/DL (ref 0–1.2)
BUN SERPL-MCNC: 25 MG/DL (ref 8–23)
BUN/CREAT SERPL: 30.5 (ref 7–25)
CALCIUM SERPL-MCNC: 8.7 MG/DL (ref 8.6–10.5)
CHLORIDE SERPL-SCNC: 106 MMOL/L (ref 98–107)
CHOLEST SERPL-MCNC: 184 MG/DL (ref 0–200)
CHOLEST/HDLC SERPL: 2.97 {RATIO}
CO2 SERPL-SCNC: 27.9 MMOL/L (ref 22–29)
CREAT SERPL-MCNC: 0.82 MG/DL (ref 0.76–1.27)
EGFRCR SERPLBLD CKD-EPI 2021: 93.9 ML/MIN/1.73
EOSINOPHIL # BLD AUTO: 0.2 10*3/MM3 (ref 0–0.4)
EOSINOPHIL NFR BLD AUTO: 3.5 % (ref 0.3–6.2)
ERYTHROCYTE [DISTWIDTH] IN BLOOD BY AUTOMATED COUNT: 12.9 % (ref 12.3–15.4)
GLOBULIN SER CALC-MCNC: 1.7 GM/DL
GLUCOSE SERPL-MCNC: 91 MG/DL (ref 65–99)
HCT VFR BLD AUTO: 42.8 % (ref 37.5–51)
HDLC SERPL-MCNC: 62 MG/DL (ref 40–60)
HGB BLD-MCNC: 14.4 G/DL (ref 13–17.7)
IMM GRANULOCYTES # BLD AUTO: 0.02 10*3/MM3 (ref 0–0.05)
IMM GRANULOCYTES NFR BLD AUTO: 0.4 % (ref 0–0.5)
LDLC SERPL CALC-MCNC: 112 MG/DL (ref 0–100)
LYMPHOCYTES # BLD AUTO: 1.48 10*3/MM3 (ref 0.7–3.1)
LYMPHOCYTES NFR BLD AUTO: 26 % (ref 19.6–45.3)
MCH RBC QN AUTO: 29.3 PG (ref 26.6–33)
MCHC RBC AUTO-ENTMCNC: 33.6 G/DL (ref 31.5–35.7)
MCV RBC AUTO: 87 FL (ref 79–97)
MONOCYTES # BLD AUTO: 0.41 10*3/MM3 (ref 0.1–0.9)
MONOCYTES NFR BLD AUTO: 7.2 % (ref 5–12)
NEUTROPHILS # BLD AUTO: 3.56 10*3/MM3 (ref 1.7–7)
NEUTROPHILS NFR BLD AUTO: 62.4 % (ref 42.7–76)
NRBC BLD AUTO-RTO: 0 /100 WBC (ref 0–0.2)
PLATELET # BLD AUTO: 220 10*3/MM3 (ref 140–450)
POTASSIUM SERPL-SCNC: 5.1 MMOL/L (ref 3.5–5.2)
PROT SERPL-MCNC: 5.5 G/DL (ref 6–8.5)
RBC # BLD AUTO: 4.92 10*6/MM3 (ref 4.14–5.8)
SODIUM SERPL-SCNC: 142 MMOL/L (ref 136–145)
TRIGL SERPL-MCNC: 49 MG/DL (ref 0–150)
VLDLC SERPL CALC-MCNC: 10 MG/DL (ref 5–40)
WBC # BLD AUTO: 5.7 10*3/MM3 (ref 3.4–10.8)

## 2024-10-11 ENCOUNTER — OFFICE VISIT (OUTPATIENT)
Dept: INTERNAL MEDICINE | Facility: CLINIC | Age: 69
End: 2024-10-11
Payer: MEDICARE

## 2024-10-11 VITALS
DIASTOLIC BLOOD PRESSURE: 90 MMHG | HEART RATE: 60 BPM | SYSTOLIC BLOOD PRESSURE: 140 MMHG | TEMPERATURE: 97.8 F | BODY MASS INDEX: 27.31 KG/M2 | WEIGHT: 207 LBS | OXYGEN SATURATION: 98 %

## 2024-10-11 DIAGNOSIS — E78.5 HYPERLIPIDEMIA LDL GOAL <100: ICD-10-CM

## 2024-10-11 DIAGNOSIS — Z23 NEEDS FLU SHOT: ICD-10-CM

## 2024-10-11 DIAGNOSIS — M15.0 PRIMARY OSTEOARTHRITIS INVOLVING MULTIPLE JOINTS: ICD-10-CM

## 2024-10-11 DIAGNOSIS — Z00.00 ENCOUNTER FOR ANNUAL WELLNESS VISIT (AWV) IN MEDICARE PATIENT: Primary | ICD-10-CM

## 2024-10-11 DIAGNOSIS — H91.13 PRESBYCUSIS OF BOTH EARS: ICD-10-CM

## 2024-10-11 DIAGNOSIS — Z12.5 SCREENING FOR PROSTATE CANCER: ICD-10-CM

## 2024-10-11 LAB — PSA SERPL-MCNC: 0.61 NG/ML (ref 0–4)

## 2024-10-11 RX ORDER — ROSUVASTATIN CALCIUM 40 MG/1
40 TABLET, COATED ORAL NIGHTLY
Qty: 90 TABLET | Refills: 3 | Status: SHIPPED | OUTPATIENT
Start: 2024-10-11

## 2024-10-11 NOTE — PROGRESS NOTES
Subjective   The ABCs of the Annual Wellness Visit  Medicare Wellness Visit      Chad Marte is a 69 y.o. patient who presents for a Medicare Wellness Visit.    The following portions of the patient's history were reviewed and   updated as appropriate: allergies, current medications, past family history, past medical history, past social history, past surgical history, and problem list.    Compared to one year ago, the patient's physical   health is the same.  Compared to one year ago, the patient's mental   health is the same.    Recent Hospitalizations:  He was not admitted to the hospital during the last year. (Colonoscopy only)    Current Medical Providers:  Patient Care Team:  Rita Hawley APRN as PCP - Zuhair Young MD as Consulting Physician (Urology)  Clayton Keene MD as Surgeon (Orthopedic Surgery)    Outpatient Medications Prior to Visit   Medication Sig Dispense Refill    acetaminophen (Tylenol 8 Hour) 650 MG 8 hr tablet Take 1 tablet by mouth Every 8 (Eight) Hours As Needed for Mild Pain.      meloxicam (MOBIC) 15 MG tablet TAKE 1 TABLET BY MOUTH DAILY 90 tablet 3    Multiple Vitamins-Minerals (multivitamin and minerals) liquid liquid Take  by mouth Daily.      Omega-3 Fatty Acids (fish oil) 1000 MG capsule capsule Take  by mouth Daily With Breakfast.      rosuvastatin (CRESTOR) 20 MG tablet TAKE 1 TABLET BY MOUTH AT NIGHT 90 tablet 3    TURMERIC PO Take  by mouth.      Zn-Pyg Afri-Nettle-Saw Palmet (SAW PALMETTO COMPLEX PO) Take  by mouth.       No facility-administered medications prior to visit.     No opioid medication identified on active medication list. I have reviewed chart for other potential  high risk medication/s and harmful drug interactions in the elderly.      Aspirin is not on active medication list.  Aspirin use is not indicated based on review of current medical condition/s. Risk of harm outweighs potential benefits.  .    Patient Active Problem List   Diagnosis     "Primary osteoarthritis involving multiple joints    Hematuria    Proteinuria    H/O prostate biopsy    Primary osteoarthritis of left knee    Hyperlipidemia LDL goal <100    Encounter for annual wellness visit (AWV) in Medicare patient    Personal history of colonic polyps    Encounter for screening for malignant neoplasm of prostate    Osteoarthritis of finger of right hand    Hearing loss due to cerumen impaction, right     Advance Care Planning Advance Directive is not on file.  ACP discussion was held with the patient during this visit. Patient does not have an advance directive, information provided.            Objective   Vitals:    10/11/24 0751   BP: 140/90   BP Location: Right arm   Patient Position: Sitting   Cuff Size: Adult   Pulse: 60   Temp: 97.8 °F (36.6 °C)   TempSrc: Infrared   SpO2: 98%   Weight: 93.9 kg (207 lb)   PainSc: 0-No pain       Estimated body mass index is 27.31 kg/m² as calculated from the following:    Height as of 7/22/24: 185.4 cm (73\").    Weight as of this encounter: 93.9 kg (207 lb).    BMI is >= 25 and <30. (Overweight) The following options were offered after discussion;: exercise counseling/recommendations and nutrition counseling/recommendations       Does the patient have evidence of cognitive impairment? No  Lab Results   Component Value Date    CHLPL 184 10/03/2024    TRIG 49 10/03/2024    HDL 62 (H) 10/03/2024     (H) 10/03/2024    VLDL 10 10/03/2024       ECG 12 Lead    Date/Time: 10/11/2024 8:21 AM  Performed by: Rita Hawley APRN    Authorized by: Rita Hawley APRN  Comparison: compared with previous ECG from 8/23/2023  Similar to previous ECG  Rhythm: sinus rhythm  Ectopy comments: none  Rate: bradycardic  BPM: 57  Conduction: conduction normal  Conduction comments: IN 0.20 QRS 0.10  ST Segments: ST segments normal  QRS axis: normal    Clinical impression: normal ECG                                                                                              "   Health  Risk Assessment    Smoking Status:  Social History     Tobacco Use   Smoking Status Never    Passive exposure: Never   Smokeless Tobacco Never     Alcohol Consumption:  Social History     Substance and Sexual Activity   Alcohol Use Yes    Alcohol/week: 5.0 standard drinks of alcohol    Types: 3 Glasses of wine, 2 Cans of beer per week       Fall Risk Screen  STEADI Fall Risk Assessment was completed, and patient is at LOW risk for falls.Assessment completed on:10/11/2024    Depression Screening:      10/11/2024     7:53 AM   PHQ-2/PHQ-9 Depression Screening   Little Interest or Pleasure in Doing Things 0-->not at all   Feeling Down, Depressed or Hopeless 0-->not at all   PHQ-9: Brief Depression Severity Measure Score 0     Health Habits and Functional and Cognitive Screening:      10/4/2024     7:40 AM   Functional & Cognitive Status   Do you have difficulty preparing food and eating? No   Do you have difficulty bathing yourself, getting dressed or grooming yourself? No   Do you have difficulty using the toilet? No   Do you have difficulty moving around from place to place? No   Do you have trouble with steps or getting out of a bed or a chair? No   Current Diet Well Balanced Diet   Dental Exam Up to date   Eye Exam Up to date   Exercise (times per week) 3 times per week   Current Exercises Include Bicycling Outdoors;Gardening;Home Fitness Gym;Walking;Yard Work   Do you need help using the phone?  No   Are you deaf or do you have serious difficulty hearing?  No   Do you need help to go to places out of walking distance? No   Do you need help shopping? No   Do you need help preparing meals?  No   Do you need help with housework?  No   Do you need help with laundry? No   Do you need help taking your medications? No   Do you need help managing money? No   Do you ever drive or ride in a car without wearing a seat belt? No   Have you felt unusual stress, anger or loneliness in the last month? No   Who do you  live with? Spouse   If you need help, do you have trouble finding someone available to you? No   Have you been bothered in the last four weeks by sexual problems? No   Do you have difficulty concentrating, remembering or making decisions? No           Age-appropriate Screening Schedule:  Refer to the list below for future screening recommendations based on patient's age, sex and/or medical conditions. Orders for these recommended tests are listed in the plan section. The patient has been provided with a written plan.    Health Maintenance List  Health Maintenance   Topic Date Due    INFLUENZA VACCINE  08/01/2024    ANNUAL WELLNESS VISIT  08/23/2024    BMI FOLLOWUP  08/23/2024    COVID-19 Vaccine (4 - 2023-24 season) 09/01/2024    LIPID PANEL  10/03/2025    TDAP/TD VACCINES (3 - Td or Tdap) 04/30/2029    COLORECTAL CANCER SCREENING  07/22/2034    HEPATITIS C SCREENING  Completed    Pneumococcal Vaccine 65+  Completed    ZOSTER VACCINE  Discontinued                                                                                                                                                CMS Preventative Services Quick Reference  Risk Factors Identified During Encounter  Immunizations Discussed/Encouraged: Influenza, Prevnar 20 (Pneumococcal 20-valent conjugate), and COVID19    The above risks/problems have been discussed with the patient.  Pertinent information has been shared with the patient in the After Visit Summary.  An After Visit Summary and PPPS were made available to the patient.    Follow Up:   Next Medicare Wellness visit to be scheduled in 1 year.         Additional E&M Note during same encounter follows:  Patient has additional, significant, and separately identifiable condition(s)/problem(s) that require work above and beyond the Medicare Wellness Visit     Chief Complaint  Medicare Wellness-subsequent (With flu vaccine/)    Subjective   HPI  Chad is also being seen today for additional medical  problem/s.    He will need a follow up on hyperlipidemia and OA of right knee. He is on Crestor 20mg nightly, and is tolerating it well. He has OA right knee, has hx LTKR. He is very active and goes to the Gym, walking and lift weights.    Review of Systems   Constitutional: Negative.    Respiratory: Negative.     Cardiovascular: Negative.  Negative for chest pain, palpitations and leg swelling.   Endocrine: Negative.    Genitourinary: Negative.    Musculoskeletal:  Positive for arthralgias (knees).   Allergic/Immunologic: Negative.    Neurological: Negative.    Hematological: Negative.    Psychiatric/Behavioral: Negative.     All other systems reviewed and are negative.           Objective   Vital Signs:  /90 (BP Location: Right arm, Patient Position: Sitting, Cuff Size: Adult)   Pulse 60   Temp 97.8 °F (36.6 °C) (Infrared)   Wt 93.9 kg (207 lb)   SpO2 98%   BMI 27.31 kg/m²   Physical Exam  Vitals reviewed.   Constitutional:       Appearance: Normal appearance. He is not ill-appearing.   HENT:      Head: Normocephalic.      Right Ear: Tympanic membrane normal. Decreased hearing noted.      Left Ear: Decreased hearing noted. There is impacted cerumen.      Nose: No rhinorrhea.   Cardiovascular:      Rate and Rhythm: Normal rate and regular rhythm.      Pulses: Normal pulses.      Heart sounds: Normal heart sounds. No murmur heard.  Pulmonary:      Effort: Pulmonary effort is normal. No respiratory distress.      Breath sounds: Normal breath sounds. No stridor. No rhonchi.   Abdominal:      General: Abdomen is flat. There is no distension.      Palpations: Abdomen is soft. There is no mass.   Musculoskeletal:      Right lower leg: No edema.      Left lower leg: No edema.   Skin:     General: Skin is warm and dry.      Capillary Refill: Capillary refill takes less than 2 seconds.   Neurological:      General: No focal deficit present.      Mental Status: He is alert and oriented to person, place, and time.       Gait: Gait normal.   Psychiatric:         Mood and Affect: Mood normal.         Behavior: Behavior normal.         Thought Content: Thought content normal.         The following data was reviewed by: JOSSELYN Anderson on 10/11/2024:        Assessment and Plan Additional age appropriate preventative wellness advice topics were discussed during today's preventative wellness exam(some topics already addressed during AWV portion of the note above):    Physical Activity: Advised cardiovascular activity 150 minutes per week as tolerated. (example brisk walk for 30 minutes, 5 days a week).     Nutrition: Discussed nutrition plan with patient. Information shared in after visit summary. Goal is for a well balanced diet to enhance overall health.   Discussed hearing evaluation              Encounter for annual wellness visit (AWV) in Medicare patient    Hyperlipidemia LDL goal <100     Screening for prostate cancer    Primary osteoarthritis involving multiple joints    Presbycusis of both ears    Needs flu shot    No orders of the defined types were placed in this encounter.          I spent 45 minutes caring for Chad on this date of service. This time includes time spent by me in the following activities:preparing for the visit, reviewing tests, obtaining and/or reviewing a separately obtained history, performing a medically appropriate examination and/or evaluation , counseling and educating the patient/family/caregiver, ordering medications, tests, or procedures, referring and communicating with other health care professionals , and documenting information in the medical record  Follow Up   6 months w labs  Patient was given instructions and counseling regarding his condition or for health maintenance advice. Please see specific information pulled into the AVS if appropriate.

## 2025-01-30 ENCOUNTER — TRANSCRIBE ORDERS (OUTPATIENT)
Dept: ADMINISTRATIVE | Facility: HOSPITAL | Age: 70
End: 2025-01-30
Payer: MEDICARE

## 2025-01-30 DIAGNOSIS — S83.206A TEAR OF RIGHT MENISCUS AS CURRENT INJURY, INITIAL ENCOUNTER: Primary | ICD-10-CM

## 2025-02-18 ENCOUNTER — HOSPITAL ENCOUNTER (OUTPATIENT)
Dept: MRI IMAGING | Facility: HOSPITAL | Age: 70
Discharge: HOME | End: 2025-02-18
Payer: MEDICARE

## 2025-02-18 DIAGNOSIS — S83.206A TEAR OF RIGHT MENISCUS AS CURRENT INJURY, INITIAL ENCOUNTER: ICD-10-CM

## 2025-02-18 PROCEDURE — 73721 MRI JNT OF LWR EXTRE W/O DYE: CPT

## 2025-03-26 ENCOUNTER — TELEPHONE (OUTPATIENT)
Dept: INTERNAL MEDICINE | Facility: CLINIC | Age: 70
End: 2025-03-26
Payer: MEDICARE

## 2025-04-07 DIAGNOSIS — E29.1 HYPOGONADISM IN MALE: ICD-10-CM

## 2025-04-07 DIAGNOSIS — E78.5 HYPERLIPIDEMIA LDL GOAL <100: Primary | ICD-10-CM

## 2025-04-08 DIAGNOSIS — Z13.29 SCREENING FOR THYROID DISORDER: ICD-10-CM

## 2025-04-08 DIAGNOSIS — E78.5 HYPERLIPIDEMIA LDL GOAL <100: Primary | ICD-10-CM

## 2025-04-08 DIAGNOSIS — Z13.1 SCREENING FOR DIABETES MELLITUS: ICD-10-CM

## 2025-04-18 ENCOUNTER — OFFICE VISIT (OUTPATIENT)
Dept: INTERNAL MEDICINE | Facility: CLINIC | Age: 70
End: 2025-04-18
Payer: MEDICARE

## 2025-04-18 VITALS
HEART RATE: 62 BPM | WEIGHT: 217.8 LBS | TEMPERATURE: 98 F | DIASTOLIC BLOOD PRESSURE: 86 MMHG | SYSTOLIC BLOOD PRESSURE: 122 MMHG | OXYGEN SATURATION: 98 % | HEIGHT: 73 IN | BODY MASS INDEX: 28.86 KG/M2

## 2025-04-18 DIAGNOSIS — E78.5 HYPERLIPIDEMIA LDL GOAL <100: Primary | ICD-10-CM

## 2025-04-18 DIAGNOSIS — M15.0 PRIMARY OSTEOARTHRITIS INVOLVING MULTIPLE JOINTS: Chronic | ICD-10-CM

## 2025-04-18 DIAGNOSIS — H90.3: ICD-10-CM

## 2025-04-18 DIAGNOSIS — R29.898 MUSCLE FUNCTION LOSS: ICD-10-CM

## 2025-04-18 PROBLEM — Z23 NEEDS FLU SHOT: Status: RESOLVED | Noted: 2019-04-30 | Resolved: 2025-04-18

## 2025-04-18 RX ORDER — SILDENAFIL 50 MG/1
TABLET, FILM COATED ORAL
COMMUNITY
Start: 2025-03-16

## 2025-04-18 RX ORDER — MUPIROCIN 20 MG/G
OINTMENT TOPICAL
COMMUNITY
Start: 2025-04-09

## 2025-04-18 NOTE — PROGRESS NOTES
Chief Complaint   Patient presents with    Hyperlipidemia     Follow up       Subjective     Chad Marte is a 70 y.o. male being seen for a follow up appointment today regarding hyperlipidemia and OA of right knee. He is on Crestor 40mg nightly, and is tolerating it well. He has OA right knee, has hx LTKR. He is very active and goes to the Gym, walking and lift weights. He has had muscle loss and would like Testosterone levels checked. He has seen urology and hand surgery since last visit. He is complaining of worsening hearing loss.     History of Present Illness     Allergies   Allergen Reactions    Lipitor [Atorvastatin] Myalgia         Current Outpatient Medications:     acetaminophen (Tylenol 8 Hour) 650 MG 8 hr tablet, Take 1 tablet by mouth Every 8 (Eight) Hours As Needed for Mild Pain., Disp: , Rfl:     CeleBREX 200 MG capsule, , Disp: , Rfl:     meloxicam (MOBIC) 15 MG tablet, TAKE 1 TABLET BY MOUTH DAILY, Disp: 90 tablet, Rfl: 3    Multiple Vitamins-Minerals (multivitamin and minerals) liquid liquid, Take  by mouth Daily., Disp: , Rfl:     mupirocin (BACTROBAN) 2 % ointment, , Disp: , Rfl:     Omega-3 Fatty Acids (fish oil) 1000 MG capsule capsule, Take  by mouth Daily With Breakfast., Disp: , Rfl:     rosuvastatin (CRESTOR) 40 MG tablet, Take 1 tablet by mouth Every Night., Disp: 90 tablet, Rfl: 3    sildenafil (VIAGRA) 50 MG tablet, , Disp: , Rfl:     TURMERIC PO, Take  by mouth., Disp: , Rfl:     Zn-Pyg Afri-Nettle-Saw Palmet (SAW PALMETTO COMPLEX PO), Take  by mouth., Disp: , Rfl:     The following portions of the patient's history were reviewed and updated as appropriate: allergies, current medications, past family history, past medical history, past social history, past surgical history, and problem list.    Review of Systems   Constitutional: Negative.    HENT:  Positive for hearing loss.    Eyes: Negative.    Respiratory: Negative.     Cardiovascular: Negative.    Gastrointestinal: Negative.     Endocrine: Negative.    Genitourinary: Negative.    Musculoskeletal:  Positive for arthralgias.   Allergic/Immunologic: Negative.  Negative for environmental allergies and food allergies.   Neurological: Negative.  Negative for dizziness and facial asymmetry.   Hematological: Negative.  Negative for adenopathy. Does not bruise/bleed easily.   Psychiatric/Behavioral: Negative.  Negative for agitation and behavioral problems.    All other systems reviewed and are negative.      Assessment     Physical Exam  Vitals reviewed.   Constitutional:       Appearance: Normal appearance.   HENT:      Head: Normocephalic.      Right Ear: There is impacted cerumen.      Left Ear: There is impacted cerumen.   Cardiovascular:      Rate and Rhythm: Normal rate and regular rhythm.      Pulses: Normal pulses.      Heart sounds: Normal heart sounds.   Pulmonary:      Effort: Pulmonary effort is normal.      Breath sounds: Normal breath sounds.   Abdominal:      General: Abdomen is flat.   Musculoskeletal:      Cervical back: Neck supple.      Right lower leg: No edema.      Left lower leg: No edema.   Skin:     General: Skin is warm and dry.   Neurological:      General: No focal deficit present.      Mental Status: He is alert and oriented to person, place, and time.   Psychiatric:         Mood and Affect: Mood normal.         Behavior: Behavior normal.         Thought Content: Thought content normal.         Plan     His fasting labs were reviewed with the patient from last week.     Diagnoses and all orders for this visit:    1. Hyperlipidemia LDL goal <100 (Primary)  Comments:  LDL goal < 130 on Crestor 40mg nightly    2. Primary osteoarthritis involving multiple joints  Comments:  Pednign RTKR, scheduled for pre-op labs, instructed to hold Celebrex 200mg at least 5 days prior    3. Muscle function loss  Comments:  Requests Testosterone levels.  Orders:  -     Testosterone (Free & Total), LC / MS    4. Bilateral central hearing  loss  -     Ear Cerumen Removal        He is having surgery for RTKR on 5-. Will hold Celebrex 1 weeks prior to surgery.     Follow up in 6 months w ESDRAS

## 2025-04-18 NOTE — PROGRESS NOTES
Procedure   Ear Cerumen Removal    Date/Time: 4/18/2025 9:34 AM    Performed by: Rita Hawley APRN  Authorized by: Rita Hawley APRN  Consent: Verbal consent obtained  Risks and benefits: risks, benefits and alternatives were discussed  Consent given by: patient  Patient understanding: patient states understanding of the procedure being performed  Patient identity confirmed: verbally with patient  Location details: left ear and right ear  Patient tolerance: patient tolerated the procedure well with no immediate complications  Procedure type: irrigation   Sedation:  Patient sedated: no

## 2025-04-22 LAB
TESTOST FREE SERPL-MCNC: 6.6 PG/ML (ref 6.6–18.1)
TESTOST SERPL-MCNC: 441.2 NG/DL (ref 264–916)

## 2025-04-24 ENCOUNTER — TRANSCRIBE ORDERS (OUTPATIENT)
Dept: ADMINISTRATIVE | Facility: HOSPITAL | Age: 70
End: 2025-04-24
Payer: MEDICARE

## 2025-04-24 ENCOUNTER — LAB (OUTPATIENT)
Dept: LAB | Facility: HOSPITAL | Age: 70
End: 2025-04-24
Payer: MEDICARE

## 2025-04-24 ENCOUNTER — HOSPITAL ENCOUNTER (OUTPATIENT)
Dept: CARDIOLOGY | Facility: HOSPITAL | Age: 70
Discharge: HOME | End: 2025-04-24
Payer: MEDICARE

## 2025-04-24 DIAGNOSIS — Z01.812 PRE-OPERATIVE LABORATORY EXAMINATION: Primary | ICD-10-CM

## 2025-04-24 DIAGNOSIS — Z01.812 PRE-OPERATIVE LABORATORY EXAMINATION: ICD-10-CM

## 2025-04-24 LAB
ANION GAP SERPL CALCULATED.3IONS-SCNC: 8.3 MMOL/L (ref 5–15)
BUN SERPL-MCNC: 27 MG/DL (ref 8–23)
BUN/CREAT SERPL: 36 (ref 7–25)
CALCIUM SPEC-SCNC: 9 MG/DL (ref 8.6–10.5)
CHLORIDE SERPL-SCNC: 107 MMOL/L (ref 98–107)
CO2 SERPL-SCNC: 24.7 MMOL/L (ref 22–29)
CREAT SERPL-MCNC: 0.75 MG/DL (ref 0.76–1.27)
DEPRECATED RDW RBC AUTO: 41.1 FL (ref 37–54)
EGFRCR SERPLBLD CKD-EPI 2021: 95.9 ML/MIN/1.73
ERYTHROCYTE [DISTWIDTH] IN BLOOD BY AUTOMATED COUNT: 12.8 % (ref 12.3–15.4)
GLUCOSE SERPL-MCNC: 79 MG/DL (ref 65–99)
HCT VFR BLD AUTO: 42.9 % (ref 37.5–51)
HGB BLD-MCNC: 14.8 G/DL (ref 13–17.7)
MCH RBC QN AUTO: 30.4 PG (ref 26.6–33)
MCHC RBC AUTO-ENTMCNC: 34.5 G/DL (ref 31.5–35.7)
MCV RBC AUTO: 88.1 FL (ref 79–97)
PLATELET # BLD AUTO: 200 10*3/MM3 (ref 140–450)
PMV BLD AUTO: 8.7 FL (ref 6–12)
POTASSIUM SERPL-SCNC: 4.5 MMOL/L (ref 3.5–5.2)
RBC # BLD AUTO: 4.87 10*6/MM3 (ref 4.14–5.8)
SODIUM SERPL-SCNC: 140 MMOL/L (ref 136–145)
WBC NRBC COR # BLD AUTO: 4.96 10*3/MM3 (ref 3.4–10.8)

## 2025-04-24 PROCEDURE — 36415 COLL VENOUS BLD VENIPUNCTURE: CPT

## 2025-04-24 PROCEDURE — 80048 BASIC METABOLIC PNL TOTAL CA: CPT

## 2025-04-24 PROCEDURE — 93005 ELECTROCARDIOGRAM TRACING: CPT | Performed by: ORTHOPAEDIC SURGERY

## 2025-04-24 PROCEDURE — 85027 COMPLETE CBC AUTOMATED: CPT

## 2025-04-25 LAB
QT INTERVAL: 420 MS
QTC INTERVAL: 403 MS

## 2025-07-28 ENCOUNTER — TELEPHONE (OUTPATIENT)
Dept: INTERNAL MEDICINE | Facility: CLINIC | Age: 70
End: 2025-07-28
Payer: MEDICARE

## 2025-07-28 NOTE — TELEPHONE ENCOUNTER
Caller: Chad Marte    Relationship to patient: Self    Best call back number:     [phone]       Chief complaint: LABS    Type of visit: LABS    Requested date: WEEK PRIOR TO DEC 9 WELLNESS VISIT. PLEASE CALL TO LET PATIENT KNOW WHEN LABS HAVE BEEN SCHEDULED FOR, VOICEMAIL CAN BE LEFT.

## (undated) DEVICE — KT ORCA ORCAPOD DISP STRL

## (undated) DEVICE — FRCP BX RADJAW4 NDL 2.8 240CM LG OG BX40

## (undated) DEVICE — LINER SURG CANSTR SXN S/RIGD 1500CC

## (undated) DEVICE — ADAPT CLN BIOGUARD AIR/H2O DISP

## (undated) DEVICE — Device

## (undated) DEVICE — SPNG GZ WOVN 4X4IN 12PLY 10/BX STRL

## (undated) DEVICE — BW-412T DISP COMBO CLEANING BRUSH: Brand: SINGLE USE COMBINATION CLEANING BRUSH

## (undated) DEVICE — JACKT LAB F/R KNIT CUFF/COLR XLG BLU

## (undated) DEVICE — GLV SURG SENSICARE PI MIC PF SZ8 LF STRL

## (undated) DEVICE — SUCTION CANISTER, 1000CC,SAFELINER: Brand: DEROYAL

## (undated) DEVICE — GLV SURG SENSICARE PI MIC PF SZ7.5 LF STRL

## (undated) DEVICE — SYR LL 3CC

## (undated) DEVICE — VIAL FORMALIN CAP 10P 40ML